# Patient Record
Sex: FEMALE | URBAN - METROPOLITAN AREA
[De-identification: names, ages, dates, MRNs, and addresses within clinical notes are randomized per-mention and may not be internally consistent; named-entity substitution may affect disease eponyms.]

---

## 2017-01-10 ENCOUNTER — OFFICE VISIT (OUTPATIENT)
Dept: PSYCHIATRY | Facility: CLINIC | Age: 57
End: 2017-01-10
Attending: PSYCHIATRY & NEUROLOGY
Payer: COMMERCIAL

## 2017-01-10 VITALS
BODY MASS INDEX: 36.94 KG/M2 | SYSTOLIC BLOOD PRESSURE: 136 MMHG | HEART RATE: 75 BPM | DIASTOLIC BLOOD PRESSURE: 86 MMHG | WEIGHT: 183 LBS

## 2017-01-10 DIAGNOSIS — F31.81 BIPOLAR 2 DISORDER (H): Primary | ICD-10-CM

## 2017-01-10 PROCEDURE — 99212 OFFICE O/P EST SF 10 MIN: CPT | Mod: ZF

## 2017-01-10 RX ORDER — LAMOTRIGINE 100 MG/1
100 TABLET ORAL AT BEDTIME
Qty: 60 TABLET | Refills: 0 | Status: SHIPPED | OUTPATIENT
Start: 2017-01-10 | End: 2017-02-07

## 2017-01-10 RX ORDER — QUETIAPINE FUMARATE 25 MG/1
25 TABLET, FILM COATED ORAL
Qty: 120 TABLET | Refills: 0 | Status: SHIPPED | OUTPATIENT
Start: 2017-01-10 | End: 2017-07-25

## 2017-01-10 NOTE — PATIENT INSTRUCTIONS
Increase Lamotrigine to 300 mg at night  Stop Ambien  Take Seroquel 25-50 mg as needed for sleep  Continue other medications  Return in 4 weeks

## 2017-01-10 NOTE — NURSING NOTE
Chief Complaint   Patient presents with     Eval/Assessment     Reviewed allergies, smoking status, and pharmacy preference  Administered abuse screening questions   Obtained weight, blood pressure and heart rate

## 2017-01-12 ASSESSMENT — PATIENT HEALTH QUESTIONNAIRE - PHQ9: SUM OF ALL RESPONSES TO PHQ QUESTIONS 1-9: 12

## 2017-02-07 ENCOUNTER — OFFICE VISIT (OUTPATIENT)
Dept: PSYCHIATRY | Facility: CLINIC | Age: 57
End: 2017-02-07
Attending: PSYCHIATRY & NEUROLOGY
Payer: COMMERCIAL

## 2017-02-07 VITALS — SYSTOLIC BLOOD PRESSURE: 131 MMHG | HEART RATE: 76 BPM | DIASTOLIC BLOOD PRESSURE: 82 MMHG

## 2017-02-07 DIAGNOSIS — F31.9 BIPOLAR AFFECTIVE DISORDER, REMISSION STATUS UNSPECIFIED (H): ICD-10-CM

## 2017-02-07 DIAGNOSIS — F31.81 BIPOLAR 2 DISORDER (H): Primary | ICD-10-CM

## 2017-02-07 PROCEDURE — 99212 OFFICE O/P EST SF 10 MIN: CPT | Mod: ZF

## 2017-02-07 RX ORDER — LAMOTRIGINE 200 MG/1
400 TABLET ORAL AT BEDTIME
Qty: 60 TABLET | Refills: 0 | Status: SHIPPED | OUTPATIENT
Start: 2017-02-07 | End: 2017-05-12

## 2017-02-07 RX ORDER — ESCITALOPRAM OXALATE 20 MG/1
20 TABLET ORAL DAILY
Qty: 90 TABLET | Refills: 3 | Status: SHIPPED | OUTPATIENT
Start: 2017-02-07 | End: 2017-03-08

## 2017-02-07 NOTE — PATIENT INSTRUCTIONS
Increase lamotrigine to 400 mg at night  Continue other medications  Return in 4 weeks  Thank you for coming to [facility].    Lab Testing:  **If you had lab testing today and your results are reassuring or normal they will be mailed to you or sent through [employer] within 7 days.   **If the lab tests need quick action we will call you with the results.  The phone number we will call with results is # [phone] (home) . If this is not the best number please call our clinic and change the number.    Medication Refills:  If you need any refills please call your pharmacy and they will contact us. Please allow three business for refill processing.   If you need to  your refill at a new pharmacy, please contact the new pharmacy directly. The new pharmacy will help you get your medications transferred.     Scheduling:  If you have any concerns about today's visit or wish to schedule another appointment please call our office during normal business hours [phone] (8-5:00 M-F)    Contact Us:  Please call [phone] during business hours (8-5:00 M-F).  If after clinic hours, or on the weekend, please call  [phone].      MENTAL HEALTH CRISIS NUMBERS:  [facility]:   [facility] - [phone]   Crisis Residence [facility] - [phone]   Walk-In Counseling [place] [phone]   COPE 24/7 [place] Mobile Team for Adults - [[phone]]; Child - [[phone]]     Crisis Connection - [phone]     [facility] - [phone]   Walk-in counseling [facility] - [phone]   Walk-in counseling [facility] - [phone]   Crisis Residence [facility] - [phone]   Urgent Care Adult Mental Health:   --Drop-in, 24/7 crisis line, and Pavel Hernández Mobile Team [[phone]]    CRISIS TEXT LINE: Text 127-511 from anywhere, anytime, any crisis 24/7;    OR SEE  www.crisistextline.org     Poison Control Center - [phone]    CHILD: [facility] needs assessment team - [phone]     [facility] Lifeline - [phone]; or [facility] Lifeline - 1-568.792.9062    If you have a medical emergency please call 911or go to the nearest ER.                    _____________________________________________    Again thank you for choosing PSY[facility]ATRY CLINIC and please let us know how we can best partner with you to improve you and your family's health.  You may be receiving a survey in the mail regarding this appointment. We would love to have your feedback, both positive and negative, so please fill out the survey and return it using the provided envolpe. The survey is done by an external company, so your answers are anonymous.

## 2017-02-07 NOTE — MR AVS SNAPSHOT
After Visit Summary   2/7/2017    Mary Mathews    MRN: [id number]           Patient Information     Date Of Birth          1960        Visit Information        Provider Department      2/7/2017 9:30 AM Boaz Saleem MD Psychiatry Clinic        Today's Diagnoses     Bipolar 2 disorder (H)    -  1     Bipolar affective disorder, remission status unspecified (H)           Care Instructions    Thank you for coming to [facility].    Lab Testing:  **If you had lab testing today and your results are reassuring or normal they will be mailed to you or sent through [employer] within 7 days.   **If the lab tests need quick action we will call you with the results.  The phone number we will call with results is # [phone] (home) . If this is not the best number please call our clinic and change the number.    Medication Refills:  If you need any refills please call your pharmacy and they will contact us. Please allow three business for refill processing.   If you need to  your refill at a new pharmacy, please contact the new pharmacy directly. The new pharmacy will help you get your medications transferred.     Scheduling:  If you have any concerns about today's visit or wish to schedule another appointment please call our office during normal business hours [phone] (8-5:00 M-F)    Contact Us:  Please call [phone] during business hours (8-5:00 M-F).  If after clinic hours, or on the weekend, please call  [phone].      MENTAL HEALTH CRISIS NUMBERS:  [facility]:   [facility] - [phone]   Crisis Residence [facility] Residence - [phone]   Walk-In Counseling [facility] - [phone]   COPE 24/7 [place] Mobile Team for Adults - [[phone]]; Child - [[phone]]     Crisis Connection - [phone]     [facility]:   [facility] - [phone]   Walk-in counseling [facility] - [phone]   Walk-in  counseling [facility] - [facility] - [phone]   Crisis Residence [facility] - [phone]   Urgent Care Adult Mental Health:   --Drop-in, 24/7 crisis line, and Pavel Hernández Mobile Team [[phone]]    CRISIS TEXT LINE: Text 113-491 from anywhere, anytime, any crisis 24/7;    OR SEE www.crisistextline.org     Poison Control Center - [phone]    CHILD: [facility] needs assessment team - [phone]     [facility] Lifeline - [phone]; or [employer] Lifeline - 1-501.918.4026    If you have a medical emergency please call 911or go to the nearest ER.                    _____________________________________________    Again thank you for choosing [facility]ATRY CLINIC and please let us know how we can best partner with you to improve you and your family's health.  You may be receiving a survey in the mail regarding this appointment. We would love to have your feedback, both positive and negative, so please fill out the survey and return it using the provided envolpe. The survey is done by an external company, so your answers are anonymous.           Follow-ups after your visit        Your next 10 appointments already scheduled     Feb 28, 2017 10:30 AM   Adult Med Follow UP with Boaz Saleem MD   Psychiatry Clinic ([facility])    [address] [facility] [address] [phone]              Who to contact     Please call your clinic at [phone] to:    Ask questions about your health    Make or cancel appointments    Discuss your medicines    Learn about your test results    Speak to your doctor   If you have compliments or concerns about an experience at your clinic, or if you wish to file a complaint, please contact [facility] Patient Relations at [phone] or email us at [email or web address]         Additional Information About Your Visit        Care EveryWhere ID     This  is your Care EveryWhere ID. This could be used by other organizations to access your [place] medical records  [id number]        Your Vitals Were     Pulse                   76            Blood Pressure from Last 3 Encounters:   02/07/17 131/82   01/10/17 136/86   11/23/16 118/80    Weight from Last 3 Encounters:   01/10/17 83.008 kg (183 lb)   12/22/16 81.647 kg (180 lb)   09/08/16 81.647 kg (180 lb)              Today, you had the following     No orders found for display         Today's Medication Changes          These changes are accurate as of: 2/7/17 10:12 AM.  If you have any questions, ask your nurse or doctor.               These medicines have changed or have updated prescriptions.        Dose/Directions    * lamoTRIgine 200 MG tablet   Commonly known as:  LaMICtal   This may have changed:  Another medication with the same name was changed. Make sure you understand how and when to take each.   Used for:  Bipolar affective disorder, remission status unspecified (H)   Changed by:  Soo Soto MD        Dose:  200 mg   Take 1 tablet (200 mg) by mouth daily   Quantity:  90 tablet   Refills:  1       * lamoTRIgine 200 MG tablet   Commonly known as:  LaMICtal   This may have changed:    - medication strength  - how much to take  - additional instructions   Used for:  Bipolar 2 disorder (H)   Changed by:  Boaz Saleem MD        Dose:  400 mg   Take 2 tablets (400 mg) by mouth At Bedtime   Quantity:  60 tablet   Refills:  0       * Notice:  This list has 2 medication(s) that are the same as other medications prescribed for you. Read the directions carefully, and ask your doctor or other care provider to review them with you.         Where to get your medicines      [facility] [place] [place] - [address] AT Portal to [facility]  [address], [facility] [address]     Phone:  [phone]    - escitalopram 20 MG tablet       These medications were sent to [id number] - [place], [place] - [address] AT [address]  [address], [place] [address]     Phone:  [phone]    - lamoTRIgine 200 MG tablet             Primary Care Provider Office Phone # Fax #    Soo Soto [phone]       [address] [address]        Thank you!     Thank you for choosing [facility]RY [facility]INIC  for your care. Our goal is always to provide you with excellent care. Hearing back from our patients is one way we can continue to improve our services. Please take a few minutes to complete the written survey that you may receive in the mail after your visit with us. Thank you!             Your Updated Medication List - Protect others around you: Learn how to safely use, store and throw away your medicines at www.disposemymeds.org.          This list is accurate as of: 2/7/17 10:12 AM.  Always use your most recent med list.                   Brand Name Dispense Instructions for use    * AMBIEN PO          * zolpidem 5 MG tablet    AMBIEN    30 tablet    Take 1 tablet (5 mg) by mouth nightly as needed for sleep       * cetirizine 5 MG tablet    zyrTEC         * ZYRTEC ALLERGY 10 MG Caps   Generic drug:  cetirizine HCl          diphenhydrAMINE 25 MG capsule    BENADRYL     Take 25 mg by mouth       escitalopram 20 MG tablet    LEXAPRO    90 tablet    Take 1 tablet (20 mg) by mouth daily       fluocinonide 0.05 % ointment    LIDEX     Apply affected areas on the thighs and under the breasts twice daily for 2-3 weeks       fluticasone 50 MCG/ACT spray    FLONASE     1 spray       klonoPIN 0.5 MG tablet   Generic drug:  clonazePAM      Take 0.5 mg by mouth       * lamoTRIgine 200 MG tablet    LaMICtal    90 tablet    Take 1 tablet (200 mg) by mouth daily       * lamoTRIgine 200 MG tablet    LaMICtal    60 tablet    Take 2 tablets (400 mg) by mouth At Bedtime        naproxen 500 MG tablet    NAPROSYN     Take 500 mg by mouth       nystatin cream    MYCOSTATIN     Apply topically to affected areas under breast and groin twice daily.       QUEtiapine 25 MG tablet    SEROquel    120 tablet    Take 1 tablet (25 mg) by mouth nightly as needed       RETIN-A 0.025 % cream   Generic drug:  tretinoin      Apply a small amount topically to face twice weekly at night and increase to nightly as tolerated.       * Notice:  This list has 6 medication(s) that are the same as other medications prescribed for you. Read the directions carefully, and ask your doctor or other care provider to review them with you.

## 2017-02-07 NOTE — PROGRESS NOTES
Feb 7, 2017  Progress Note    Mary Mathews is a 56 year old year old female with Bipolar II Disorder (296.89) who presents for ongoing psychiatric care. She has been  for 10 years but her  lives in [place] and they have no contact; she considers herself single. She has been in [place] for 2 years and lives on her own in [place]. She hopes to move back to [place] in the reasonably near future. She has no children. She works as a  for [employer].     Diagnosis    Axis 1: Bipolar II disorder, currently euthymic. Maintenance therapy has helped but Mary estimates she spends about 30% of the year depressed or hypomanic.   Axis 2: Likely Cluster B personality traits/disorder  Axis 3: MSK problems including gout, tendonitis in foot, knee pain. Remote cholecystectomy. Remote breast biopsy  Axis 4: Severe stressors - unhappy living in [place]; unhappy with job; socially isolated; mother in [place] with dementia (they have a difficult relationship)  Axis 5: GAF at time of visit: 70    Assessment & Plan     Mary Mathews is currently euthymic. Increasing her lamotrigine to 300 mg daily and continuing escitalopram 20 mg daily has helped further stabilize her mood. However, some affective instability remains. Cluster B personality traits (borderline, likely antisocial) are probably contributing to the picture.  She will increase her lamotrigine further to 400 mg HS. She is interested in seeing a therapist and we will discuss this further at her next visit.     The patient understands the risks, benefits, adverse effects and alternatives. Agrees to treatment with the capacity to do so. Not pregnant and no medical contraindications to treatment. Agrees to call clinic for any problems. The patient understands to call 911 or come to the nearest ED if life threatening or urgent symptoms present.    Attestation:  Patient has been seen and evaluated by me, Boaz Saleem MD, PhD.    I spent a total time  "of 30 minutes face-to-face with the patient during today s office visit.  Over 50% of this time was spent counseling the patient and/or coordinating care regarding diagnostic assessment, medication management.    CRISTINA Hernandez reports that increasing her lamotrigine has been helpful. She has had significant work stress and feels she is much less irritable and labile than otherwise expected. She denies any sugar periods of depression or hypomania since her last visit. However she describes likely dysthymia-level and mildly-hypomanic symptoms. She is interested in increasing LTG further to target these.    Mary also reports she has \"a lot of anger bottled up inside\". She relates this to experiences she had as a child. She recognizes that it contributes to inappropriate emotional displays, particularly of anger, that are independent of her mood. She has had a number of violent outbursts in the past. She also spends time thinking of ways to get revenge on people who irritate her and \"enjoys\" the idea of hurting them. She feels that seeing a therapist would be helpful.     MOOD  Denies current symptoms of depression or hypo/phuong.   ANXIETY  Endorses Symptoms of Generalized Anxiety; PTSD symptoms   PSYCHOSIS  Denies current psychotic symptoms.  MEDICATION ADHERENCE: Good   Current Medications     Current Outpatient Prescriptions   Medication Sig Dispense Refill     lamoTRIgine (LAMICTAL) 100 MG tablet Take 1 tablet (100 mg) by mouth At Bedtime Take along with 200 mg already prescribed for total dose of 300 mg HS 60 tablet 0     QUEtiapine (SEROQUEL) 25 MG tablet Take 1 tablet (25 mg) by mouth nightly as needed 120 tablet 0     clonazePAM (KLONOPIN) 0.5 MG tablet Take 0.5 mg by mouth       diphenhydrAMINE (BENADRYL) 25 MG capsule Take 25 mg by mouth       cetirizine (ZYRTEC) 5 MG tablet        fluocinonide (LIDEX) 0.05 % ointment Apply affected areas on the thighs and under the breasts twice daily for 2-3 weeks       " fluticasone (FLONASE) 50 MCG/ACT nasal spray 1 spray       naproxen (NAPROSYN) 500 MG tablet Take 500 mg by mouth       nystatin (MYCOSTATIN) cream Apply topically to affected areas under breast and groin twice daily.       tretinoin (RETIN-A) 0.025 % cream Apply a small amount topically to face twice weekly at night and increase to nightly as tolerated.       Zolpidem Tartrate (AMBIEN PO)        cetirizine HCl (ZYRTEC ALLERGY) 10 MG CAPS        escitalopram (LEXAPRO) 20 MG tablet Take 1 tablet (20 mg) by mouth daily 90 tablet 3     lamoTRIgine (LAMICTAL) 200 MG tablet Take 1 tablet (200 mg) by mouth daily 90 tablet 1     zolpidem (AMBIEN) 5 MG tablet Take 1 tablet (5 mg) by mouth nightly as needed for sleep 30 tablet 3         Substance use     ETOH: Nil  Cigarettes: Nil  Street Drugs: Nil    Review of Systems     A comprehensive review of systems was performed and is negative other than noted above.     Allergies     Allergies   Allergen Reactions     Acetaminophen Hives     Aspirin Hives     Seasonal Allergies Cough, Difficulty breathing and Itching        Mental Status Exam     /82 mmHg  Pulse 76    Alertness: alert  and oriented  Appearance: well groomed  Behavior/Demeanor: cooperative, pleasant and calm, with good  eye contact  Speech: normal  Language: intact  Psychomotor: fidgety  Mood:  description consistent with euthymia  Affect: full range; was congruent to mood  Thought Process/Associations: unremarkable  Thought Content:  Denies suicidal ideation  Perception:  Denies hallucinations  Insight: good  Judgment: good  Cognition:  does appear grossly intact.    Laboratory

## 2017-02-08 ASSESSMENT — PATIENT HEALTH QUESTIONNAIRE - PHQ9: SUM OF ALL RESPONSES TO PHQ QUESTIONS 1-9: 8

## 2017-03-08 ENCOUNTER — OFFICE VISIT (OUTPATIENT)
Dept: PSYCHIATRY | Facility: CLINIC | Age: 57
End: 2017-03-08
Attending: PSYCHIATRY & NEUROLOGY
Payer: COMMERCIAL

## 2017-03-08 VITALS — SYSTOLIC BLOOD PRESSURE: 109 MMHG | DIASTOLIC BLOOD PRESSURE: 73 MMHG | HEART RATE: 92 BPM

## 2017-03-08 DIAGNOSIS — F31.9 BIPOLAR AFFECTIVE DISORDER, REMISSION STATUS UNSPECIFIED (H): ICD-10-CM

## 2017-03-08 PROCEDURE — 99212 OFFICE O/P EST SF 10 MIN: CPT | Mod: ZF

## 2017-03-08 RX ORDER — ESCITALOPRAM OXALATE 10 MG/1
10 TABLET ORAL DAILY
Qty: 90 TABLET | Refills: 0 | Status: SHIPPED | OUTPATIENT
Start: 2017-03-08 | End: 2017-07-25

## 2017-03-08 RX ORDER — ESCITALOPRAM OXALATE 20 MG/1
20 TABLET ORAL DAILY
Qty: 90 TABLET | Refills: 3 | Status: SHIPPED | OUTPATIENT
Start: 2017-03-08 | End: 2017-07-25

## 2017-03-08 NOTE — MR AVS SNAPSHOT
After Visit Summary   3/8/2017    Mary Mathews    MRN: [id number]           Patient Information     Date Of Birth          1960        Visit Information        Provider Department      3/8/2017 2:00 PM Boaz Saleem MD Psychiatry Clinic        Today's Diagnoses     Bipolar affective disorder, remission status unspecified (H)          Care Instructions    Continue lamotrigine 400 mg daily  Increase Lexapro to 30 mg daily  Contact therapists on the list to make an appointment for talk therapy  Return in 1 month        Follow-ups after your visit        Follow-up notes from your care team     Return in about 4 weeks (around 4/5/2017).      Your next 10 appointments already scheduled     Apr 05, 2017  3:00 PM CDT   Adult Med Follow UP with Boaz Saleem MD   Psychiatry Clinic ([facility])    [place] [id number] [facility] [address] [phone]              Who to contact     Please call your clinic at [phone] to:    Ask questions about your health    Make or cancel appointments    Discuss your medicines    Learn about your test results    Speak to your doctor   If you have compliments or concerns about an experience at your clinic, or if you wish to file a complaint, please contact [facility] Patient Relations at [phone] or email us at [email or web address]         Additional Information About Your Visit        Care EveryWhere ID     This is your Care EveryWhere ID. This could be used by other organizations to access your [place] medical records  [id number]        Your Vitals Were     Pulse                   92            Blood Pressure from Last 3 Encounters:   03/08/17 109/73   02/07/17 131/82   01/10/17 136/86    Weight from Last 3 Encounters:   01/10/17 83 kg (183 lb)   12/22/16 81.6 kg (180 lb)   09/08/16 81.6 kg (180 lb)              Today, you had the following     No orders found  for display         Today's Medication Changes          These changes are accurate as of: 3/8/17  2:37 PM.  If you have any questions, ask your nurse or doctor.               These medicines have changed or have updated prescriptions.        Dose/Directions    * escitalopram 20 MG tablet   Commonly known as:  LEXAPRO   This may have changed:  Another medication with the same name was added. Make sure you understand how and when to take each.   Used for:  Bipolar affective disorder, remission status unspecified (H)   Changed by:  Boaz Saleem MD        Dose:  20 mg   Take 1 tablet (20 mg) by mouth daily   Quantity:  90 tablet   Refills:  3       * escitalopram 10 MG tablet   Commonly known as:  LEXAPRO   This may have changed:  You were already taking a medication with the same name, and this prescription was added. Make sure you understand how and when to take each.   Used for:  Bipolar affective disorder, remission status unspecified (H)   Changed by:  Boaz Saleem MD        Dose:  10 mg   Take 1 tablet (10 mg) by mouth daily Take in addition to 20 mg already prescribed for a total dose of 30 mg daily   Quantity:  90 tablet   Refills:  0       * Notice:  This list has 2 medication(s) that are the same as other medications prescribed for you. Read the directions carefully, and ask your doctor or other care provider to review them with you.         Where to get your medicines      [facility] [place], [place] - [address] AT [address] to [facility]  [address], [facility] [address]     Phone:  [phone]     escitalopram 10 MG tablet    escitalopram 20 MG tablet                Primary Care Provider Office Phone # Fax #    Soo Soto [phone]       [address] [address]        Thank you!     Thank you for choosing PSYCHIATRY CLINIC  for your care. Our goal is  always to provide you with excellent care. Hearing back from our patients is one way we can continue to improve our services. Please take a few minutes to complete the written survey that you may receive in the mail after your visit with us. Thank you!             Your Updated Medication List - Protect others around you: Learn how to safely use, store and throw away your medicines at www.disposemymeds.org.          This list is accurate as of: 3/8/17  2:37 PM.  Always use your most recent med list.                   Brand Name Dispense Instructions for use    * AMBIEN PO          * zolpidem 5 MG tablet    AMBIEN    30 tablet    Take 1 tablet (5 mg) by mouth nightly as needed for sleep       * cetirizine 5 MG tablet    zyrTEC         * ZYRTEC ALLERGY 10 MG Caps   Generic drug:  cetirizine HCl          diphenhydrAMINE 25 MG capsule    BENADRYL     Take 25 mg by mouth       * escitalopram 20 MG tablet    LEXAPRO    90 tablet    Take 1 tablet (20 mg) by mouth daily       * escitalopram 10 MG tablet    LEXAPRO    90 tablet    Take 1 tablet (10 mg) by mouth daily Take in addition to 20 mg already prescribed for a total dose of 30 mg daily       fluocinonide 0.05 % ointment    LIDEX     Apply affected areas on the thighs and under the breasts twice daily for 2-3 weeks       fluticasone 50 MCG/ACT spray    FLONASE     1 spray       klonoPIN 0.5 MG tablet   Generic drug:  clonazePAM      Take 0.5 mg by mouth       * lamoTRIgine 200 MG tablet    LaMICtal    90 tablet    Take 1 tablet (200 mg) by mouth daily       * lamoTRIgine 200 MG tablet    LaMICtal    60 tablet    Take 2 tablets (400 mg) by mouth At Bedtime       naproxen 500 MG tablet    NAPROSYN     Take 500 mg by mouth       nystatin cream    MYCOSTATIN     Apply topically to affected areas under breast and groin twice daily.       QUEtiapine 25 MG tablet    SEROquel    120 tablet    Take 1 tablet (25 mg) by mouth nightly as needed       RETIN-A 0.025 % cream   Generic  drug:  tretinoin      Apply a small amount topically to face twice weekly at night and increase to nightly as tolerated.       * Notice:  This list has 8 medication(s) that are the same as other medications prescribed for you. Read the directions carefully, and ask your doctor or other care provider to review them with you.

## 2017-03-08 NOTE — PATIENT INSTRUCTIONS
Continue lamotrigine 400 mg daily  Increase Lexapro to 30 mg daily  Contact therapists on the list to make an appointment for talk therapy  Return in 1 month

## 2017-03-08 NOTE — PROGRESS NOTES
Mar 8, 2017  Progress Note    Mary Mathews is a 56 year old year old female with Bipolar II Disorder (296.89) who presents for ongoing psychiatric care. She has been  for 10 years but her  lives in [place] and they have no contact; she considers herself single. She has been in [place] for 2 years and lives on her own in [place]. She hopes to move back to [place] in the reasonably near future. She has no children. She works as a  for [employer].     Diagnosis    Axis 1: Bipolar II disorder, currently euthymic. Maintenance therapy has helped but Mary estimates she spends about 30% of the year depressed or hypomanic.   Axis 2: Likely Cluster B personality traits/disorder  Axis 3: MSK problems including gout, tendonitis in foot, knee pain. Remote cholecystectomy. Remote breast biopsy  Axis 4: Severe stressors - unhappy living in [facility]; unhappy with job; socially isolated; mother in [place] with dementia (they have a difficult relationship)  Axis 5: GAF at time of visit: 70    Assessment & Plan     Mary Mathews is currently moderately depressed. Increasing her lamotrigine to 400 mg daily and continuing escitalopram 20 mg daily has not been additionally beneficial. She will increase escitalopram to 30 mg daily and continue lamotrigine 400 mg. I gave her a list of therapists to contact. Cluster B personality traits (borderline, likely antisocial) are probably contributing to the picture.  She will return in 4 weeks.    The patient understands the risks, benefits, adverse effects and alternatives. Agrees to treatment with the capacity to do so. Not pregnant and no medical contraindications to treatment. Agrees to call clinic for any problems. The patient understands to call 911 or come to the nearest ED if life threatening or urgent symptoms present.    Attestation:  Patient has been seen and evaluated by me, Boaz Saleem MD, PhD.    I spent a total time of 30 minutes face-to-face with  "the patient during today s office visit.  Over 50% of this time was spent counseling the patient and/or coordinating care regarding diagnostic assessment, medication management.    HPI     Mary reports that she has been more depressed over the past month. She feels down most of the time and has crying spells. She is more isolative and irritable with others. She has difficulty sleeping, with initial and middle insomnia. She has passive suicidal thoughts but denies any intent or plan.    She is unhappy in her current job but feels \"stuck\" there. She also has a long-term strained relationship with her mother, who has not been supportive lately.    Mary is agreeable with the above plan. She also requests a number for a crisis line in case her suicidal thoughts get stronger, and I will ask Toshia to send that to her.    She will return in 4 weeks.    MOOD  Denies current symptoms of depression or hypo/phuong.   ANXIETY  Endorses Symptoms of Generalized Anxiety; PTSD symptoms   PSYCHOSIS  Denies current psychotic symptoms.  MEDICATION ADHERENCE: Good   Current Medications     Current Outpatient Prescriptions   Medication Sig Dispense Refill     lamoTRIgine (LAMICTAL) 200 MG tablet Take 2 tablets (400 mg) by mouth At Bedtime 60 tablet 0     escitalopram (LEXAPRO) 20 MG tablet Take 1 tablet (20 mg) by mouth daily 90 tablet 3     QUEtiapine (SEROQUEL) 25 MG tablet Take 1 tablet (25 mg) by mouth nightly as needed 120 tablet 0     clonazePAM (KLONOPIN) 0.5 MG tablet Take 0.5 mg by mouth       diphenhydrAMINE (BENADRYL) 25 MG capsule Take 25 mg by mouth       cetirizine (ZYRTEC) 5 MG tablet        fluocinonide (LIDEX) 0.05 % ointment Apply affected areas on the thighs and under the breasts twice daily for 2-3 weeks       fluticasone (FLONASE) 50 MCG/ACT nasal spray 1 spray       naproxen (NAPROSYN) 500 MG tablet Take 500 mg by mouth       nystatin (MYCOSTATIN) cream Apply topically to affected areas under breast and groin " twice daily.       tretinoin (RETIN-A) 0.025 % cream Apply a small amount topically to face twice weekly at night and increase to nightly as tolerated.       Zolpidem Tartrate (AMBIEN PO)        cetirizine HCl (ZYRTEC ALLERGY) 10 MG CAPS        lamoTRIgine (LAMICTAL) 200 MG tablet Take 1 tablet (200 mg) by mouth daily 90 tablet 1     zolpidem (AMBIEN) 5 MG tablet Take 1 tablet (5 mg) by mouth nightly as needed for sleep 30 tablet 3         Substance use     ETOH: Nil  Cigarettes: Nil  Street Drugs: Nil    Review of Systems     A comprehensive review of systems was performed and is negative other than noted above.     Allergies     Allergies   Allergen Reactions     Acetaminophen Hives     Aspirin Hives     Seasonal Allergies Cough, Difficulty breathing and Itching        Mental Status Exam     /73  Pulse 92    Alertness: alert  and oriented  Appearance: well groomed  Behavior/Demeanor: cooperative, pleasant and calm, with good  eye contact  Speech: normal  Language: intact  Psychomotor: fidgety  Mood:  depressed and anxious  Affect: restricted; was congruent to mood  Thought Process/Associations: unremarkable  Thought Content:  Admits suicidal ideation and passive suicidal ideation with no plan or intent  Perception:  Denies hallucinations  Insight: good  Judgment: good  Cognition:  does appear grossly intact.    Laboratory

## 2017-03-09 ENCOUNTER — TELEPHONE (OUTPATIENT)
Dept: PSYCHIATRY | Facility: CLINIC | Age: 57
End: 2017-03-09

## 2017-03-09 ASSESSMENT — PATIENT HEALTH QUESTIONNAIRE - PHQ9: SUM OF ALL RESPONSES TO PHQ QUESTIONS 1-9: 13

## 2017-03-09 NOTE — TELEPHONE ENCOUNTER
Routed to POLLY Clement with request to call pt with the following #s:  Clinic After Hours  [phone]  [facility] [phone]  Suicide Prevention [phone]  Suicide Hotline 3-878-QPVUADP

## 2017-03-09 NOTE — TELEPHONE ENCOUNTER
----- Message from Boaz Saleem MD sent at 3/8/2017  2:36 PM CST -----  Sd Claros     Mary asked for a number for a Crisis Line. She has intermittent crying spells and passive SI associated with them. I didn't know a number to give her. Can you contact her?    Thx!    DB

## 2017-03-10 NOTE — TELEPHONE ENCOUNTER
Called patient again this afternoon, no answer. Left VM with clinic phone number and clinic after hours number but I don't see permission anywhere to leave detailed messages so I was unable to leave the rest of the numbers. Racquel Amaral LPN

## 2017-05-10 ENCOUNTER — TELEPHONE (OUTPATIENT)
Dept: PSYCHIATRY | Facility: CLINIC | Age: 57
End: 2017-05-10

## 2017-05-10 DIAGNOSIS — F31.81 BIPOLAR 2 DISORDER (H): ICD-10-CM

## 2017-05-10 NOTE — TELEPHONE ENCOUNTER
FW: Patient Call  Received: Today       Racquel Amaral LPN Flaa, Jodi L RN       Phone Number: [phone]                     Last refill was 3/17/17 at [facility] for 90 d/s. The script has 0 refills left, which is why it has not been refilled.                Previous Messages       ----- Message -----      From: Charmaine Valle      Sent: 5/10/2017  12:34 PM        To: Racquel Amaral LPN   Subject: Patient Call                                     Mary is calling.  She said that she received a call from our office stating that her pharmacy had called in a refill request for Lamotrigine, but we needed to clarify some info.  She would like a call back at [phone].  It is ok to leave a detailed message.      She did say that she is currently taking Lamotrigine 200 mg - 2 tablets (400 mg) at bedtime.  The pharmacy she uses is the [facility] mail order [facility].  I scheduled a follow up appointment for her with Dr. Saleem on 6/7.

## 2017-05-10 NOTE — TELEPHONE ENCOUNTER
Received RF request from pt for:  Lamictal 200 mg tab  Take 2 tabs po daily    Last RF:  3/17/17, 90 d/s at [facility] mail order    Due for RF:  Will be in the next month    Appointment History:  Last appt: 3/8/17  RTC: 1 month  Cancel: 4/5/17 pt is sick  No-show: none  Next appt: 6/7/17    Pt has about 1 month of current Rx remaining but mail order can take ~2 weeks for processing and shipping.  Routed to Dr. Saleem for authorization to provide RF now.

## 2017-05-12 RX ORDER — LAMOTRIGINE 200 MG/1
400 TABLET ORAL AT BEDTIME
Qty: 180 TABLET | Refills: 0 | Status: SHIPPED | OUTPATIENT
Start: 2017-05-12 | End: 2017-07-25

## 2017-05-12 NOTE — TELEPHONE ENCOUNTER
Boaz Saleem MD  Floskar, Toshia MIKE RN        Caller: Unspecified (2 days ago,  1:05 PM)                     Thanks for sending Toshia. Let's do the refill now, for another 90 day supply.

## 2017-06-09 ENCOUNTER — TELEPHONE (OUTPATIENT)
Dept: PSYCHIATRY | Facility: CLINIC | Age: 57
End: 2017-06-09

## 2017-06-09 NOTE — TELEPHONE ENCOUNTER
"Is now scheduled on 7/25/17.      Called pt. She begins talking about decreased concentration, forgetting things, feeling like a zombie \"like my eyes are awake but my brain isn't\".  As we talk she shares sleep continues to be poor at \"maybe 3 hours a night.\"  Difficulty sleeping has been long-term; since prior to January jackie.  Has decreased energy, isolating more because she doesn't want to go out, experiences this confusion/memory issue occurs about twice a week, feels safe, denies family hx of memory issues.  We discuss that many of her sxs can occur due to lack of sleep and she agrees stating, \"espcially my lack of energy.\"      We reviewed her meds (Lamictal, Lexapro, Seroquel at hs prn). She takes Lamictal and Lexapro as prescribed.  She had forgotten about using Seroquel prn for sleep.  She plans to take 25 mg at hs prn.   Discussed side effects of Seroquel, especially if she feels too confused or too sedated that she doesn't have to take it.     Writer will call her next week to check in and see if it's helpful.     Routed to paula Morrison.    "

## 2017-06-09 NOTE — TELEPHONE ENCOUNTER
----- Message from Rere Velez sent at 6/8/2017  4:05 PM CDT -----  Contact: [phone]  Stiven/Toshia    Patient is caller. She says that she did not remember the appt was yesterday. She says that the medications are making her a bit confused about dates, so she missed the appt. She did reschedule to the next opening

## 2017-06-22 RX ORDER — NAPROXEN 500 MG/1
TABLET ORAL
Qty: 60 TABLET | Refills: 0 | OUTPATIENT
Start: 2017-06-22

## 2017-06-22 NOTE — TELEPHONE ENCOUNTER
naproxen 500 MG       Last Written Prescription Date:  4/20/16  Last Fill Quantity: UNK # refills: UNK  Last Office Visit : 11/23/16   ALICJA DE LA ROSA  Future Office visit:  NONE  No results found for: CR]  BP Readings from Last 3 Encounters:   03/08/17 109/73   02/07/17 131/82   01/10/17 136/86   Routing refill request to provider for review/approval because:  Medication is reported/historical

## 2017-06-27 NOTE — TELEPHONE ENCOUNTER
Left a detailed message that pt needs to establish new care with another provider for further refills. Phone number given.

## 2017-07-24 DIAGNOSIS — F31.81 BIPOLAR 2 DISORDER (H): ICD-10-CM

## 2017-07-24 RX ORDER — LAMOTRIGINE 200 MG/1
400 TABLET ORAL AT BEDTIME
Qty: 60 TABLET | Refills: 0 | Status: CANCELLED | OUTPATIENT
Start: 2017-07-24

## 2017-07-24 NOTE — TELEPHONE ENCOUNTER
Medication requested: Lamictal 200 mg tabs  Last refilled: 5-12-17  Qty: 180      Last seen: 3-8-17  RTC: 4 wks  Cancel: 1  No-show: 1  Next appt: 7-25-17    Refill decision: Refill pended and routed to the provider for review/determination due to cancellation and NOS - patient is scheduled to be seen tomorrow.      Kathleen M Doege RN

## 2017-07-25 ENCOUNTER — OFFICE VISIT (OUTPATIENT)
Dept: PSYCHIATRY | Facility: CLINIC | Age: 57
End: 2017-07-25
Attending: PSYCHIATRY & NEUROLOGY
Payer: COMMERCIAL

## 2017-07-25 VITALS — DIASTOLIC BLOOD PRESSURE: 83 MMHG | SYSTOLIC BLOOD PRESSURE: 138 MMHG | HEART RATE: 76 BPM

## 2017-07-25 DIAGNOSIS — F31.9 BIPOLAR AFFECTIVE DISORDER, REMISSION STATUS UNSPECIFIED (H): ICD-10-CM

## 2017-07-25 DIAGNOSIS — G47.00 INSOMNIA, UNSPECIFIED TYPE: ICD-10-CM

## 2017-07-25 DIAGNOSIS — F31.81 BIPOLAR 2 DISORDER (H): ICD-10-CM

## 2017-07-25 PROCEDURE — 99212 OFFICE O/P EST SF 10 MIN: CPT | Mod: ZF

## 2017-07-25 RX ORDER — ZOLPIDEM TARTRATE 5 MG/1
5 TABLET ORAL
Qty: 30 TABLET | Refills: 3 | Status: SHIPPED | OUTPATIENT
Start: 2017-07-25

## 2017-07-25 RX ORDER — LAMOTRIGINE 200 MG/1
400 TABLET ORAL AT BEDTIME
Qty: 180 TABLET | Refills: 1 | Status: SHIPPED | OUTPATIENT
Start: 2017-07-25 | End: 2018-02-01

## 2017-07-25 RX ORDER — ESCITALOPRAM OXALATE 10 MG/1
10 TABLET ORAL DAILY
Qty: 90 TABLET | Refills: 1 | Status: SHIPPED | OUTPATIENT
Start: 2017-07-25 | End: 2018-02-01

## 2017-07-25 RX ORDER — ESCITALOPRAM OXALATE 20 MG/1
20 TABLET ORAL DAILY
Qty: 90 TABLET | Refills: 1 | Status: SHIPPED | OUTPATIENT
Start: 2017-07-25 | End: 2018-02-13

## 2017-07-25 NOTE — NURSING NOTE
Chief Complaint   Patient presents with     Recheck Medication     Bipolar affective disorder, remission status unspecified     Reviewed allergies, smoking status, and pharmacy preference    Obtained weight, blood pressure and heart rate

## 2017-07-25 NOTE — MR AVS SNAPSHOT
After Visit Summary   7/25/2017    Mary Mathews    MRN: [id number]           Patient Information     Date Of Birth          1960        Visit Information        Provider Department      7/25/2017 9:00 AM Boaz Saleem MD [facility]        Today's Diagnoses     Insomnia, unspecified type        Bipolar 2 disorder (H)        Bipolar affective disorder, remission status unspecified (H)          Care Instructions    Continue usual medications  Look into seeing a therapist  Increase your physical activity  Return in 8 weeks          Follow-ups after your visit        Follow-up notes from your care team     Return in about 8 weeks (around 9/19/2017).      Your next 10 appointments already scheduled     Sep 26, 2017 10:00 AM CDT   Adult Med Follow UP with Boaz Saleem MD   Psychiatry Clinic ([facility])    [place] [id number] [facility] [address] [phone]              Who to contact     Please call your clinic at [phone] to:    Ask questions about your health    Make or cancel appointments    Discuss your medicines    Learn about your test results    Speak to your doctor   If you have compliments or concerns about an experience at your clinic, or if you wish to file a complaint, please contact [facility] Patient Relations at [phone] or email us at [email or web address]         Additional Information About Your Visit        MyChart Information     [employer] is an electronic gateway that provides easy, online access to your medical records. With [employer], you can request a clinic appointment, read your test results, renew a prescription or communicate with your care team.     To sign up for [employer] visit the website at [email or web address]   You will be asked to enter the access code listed below, as well as some personal information. Please follow the directions to create your  username and password.     Your access code is: [email or web address]  Expires: 10/23/2017  9:33 AM     Your access code will  in 90 days. If you need help or a new code, please contact your [facility] or call [phone] for assistance.        Care EveryWhere ID     This is your Care EveryWhere ID. This could be used by other organizations to access your [facility] medical records  [id number]        Your Vitals Were     Pulse                   76            Blood Pressure from Last 3 Encounters:   17 138/83   17 109/73   17 131/82    Weight from Last 3 Encounters:   01/10/17 83 kg (183 lb)   16 81.6 kg (180 lb)   16 81.6 kg (180 lb)              Today, you had the following     No orders found for display         Where to get your medicines      These medications were sent to [facility] [address] AT [address] [place] [id number]  [place], [facility] [address]     Phone:  [phone]     escitalopram 10 MG tablet    escitalopram 20 MG tablet    lamoTRIgine 200 MG tablet         Some of these will need a paper prescription and others can be bought over the counter.  Ask your nurse if you have questions.     Bring a paper prescription for each of these medications     zolpidem 5 MG tablet          Primary Care Provider    None Specified       No primary provider on file.        Equal Access to Services     CARMEN CUELLAR : Hadii jimena Zhang, waaxda lupatadaha, qaybta kaalmada alex, adrien martínez . So [facility] [phone].    ATENCIÓN: Si habla español, tiene a salas disposición servicios gratuitos de asistencia lingüística. Llame al [phone].    We comply with applicable federal civil rights laws and [place] laws. We do not discriminate on the basis of race, color, national origin, age, disability sex, sexual orientation or gender identity.            Thank  you!     Thank you for choosing [facility]  for your care. Our goal is always to provide you with excellent care. Hearing back from our patients is one way we can continue to improve our services. Please take a few minutes to complete the written survey that you may receive in the mail after your visit with us. Thank you!             Your Updated Medication List - Protect others around you: Learn how to safely use, store and throw away your medicines at www.disposemymeds.org.          This list is accurate as of: 7/25/17  9:33 AM.  Always use your most recent med list.                   Brand Name Dispense Instructions for use Diagnosis    * AMBIEN PO           * zolpidem 5 MG tablet    AMBIEN    30 tablet    Take 1 tablet (5 mg) by mouth nightly as needed for sleep    Insomnia, unspecified type       * cetirizine 5 MG tablet    zyrTEC          * ZYRTEC ALLERGY 10 MG Caps   Generic drug:  cetirizine HCl           diphenhydrAMINE 25 MG capsule    BENADRYL     Take 25 mg by mouth        * escitalopram 20 MG tablet    LEXAPRO    90 tablet    Take 1 tablet (20 mg) by mouth daily    Bipolar affective disorder, remission status unspecified (H)       * escitalopram 10 MG tablet    LEXAPRO    90 tablet    Take 1 tablet (10 mg) by mouth daily Take in addition to 20 mg already prescribed for a total dose of 30 mg daily    Bipolar affective disorder, remission status unspecified (H)       fluocinonide 0.05 % ointment    LIDEX     Apply affected areas on the thighs and under the breasts twice daily for 2-3 weeks        fluticasone 50 MCG/ACT spray    FLONASE     1 spray        klonoPIN 0.5 MG tablet   Generic drug:  clonazePAM      Take 0.5 mg by mouth        lamoTRIgine 200 MG tablet    LaMICtal    180 tablet    Take 2 tablets (400 mg) by mouth At Bedtime    Bipolar 2 disorder (H)       naproxen 500 MG tablet    NAPROSYN     Take 500 mg by mouth        nystatin cream    MYCOSTATIN     Apply topically to affected areas  under breast and groin twice daily.        RETIN-A 0.025 % cream   Generic drug:  tretinoin      Apply a small amount topically to face twice weekly at night and increase to nightly as tolerated.        * Notice:  This list has 6 medication(s) that are the same as other medications prescribed for you. Read the directions carefully, and ask your doctor or other care provider to review them with you.

## 2017-07-25 NOTE — PATIENT INSTRUCTIONS
Continue usual medications  Look into seeing a therapist  Increase your physical activity  Return in 8 weeks

## 2017-07-26 ASSESSMENT — PATIENT HEALTH QUESTIONNAIRE - PHQ9: SUM OF ALL RESPONSES TO PHQ QUESTIONS 1-9: 7

## 2017-09-14 ENCOUNTER — OFFICE VISIT (OUTPATIENT)
Dept: ORTHOPEDICS | Facility: CLINIC | Age: 57
End: 2017-09-14

## 2017-09-14 VITALS — HEIGHT: 59 IN | WEIGHT: 170 LBS | BODY MASS INDEX: 34.27 KG/M2

## 2017-09-14 DIAGNOSIS — M76.62 ACHILLES TENDINITIS OF LEFT LOWER EXTREMITY: ICD-10-CM

## 2017-09-14 DIAGNOSIS — M17.0 PRIMARY OSTEOARTHRITIS OF BOTH KNEES: Primary | ICD-10-CM

## 2017-09-14 RX ORDER — NAPROXEN 500 MG/1
500 TABLET ORAL 2 TIMES DAILY WITH MEALS
Qty: 60 TABLET | Refills: 1 | Status: SHIPPED | OUTPATIENT
Start: 2017-09-14 | End: 2018-05-10

## 2017-09-14 RX ORDER — TRIAMCINOLONE ACETONIDE 40 MG/ML
40 INJECTION, SUSPENSION INTRA-ARTICULAR; INTRAMUSCULAR ONCE
Qty: 2 ML | Refills: 0 | OUTPATIENT
Start: 2017-09-14 | End: 2017-09-14

## 2017-09-14 NOTE — MR AVS SNAPSHOT
After Visit Summary   9/14/2017    Mary Mathews    MRN: [id number]           Patient Information     Date Of Birth          1960        Visit Information        Provider Department      9/14/2017 11:30 AM Rodríguez Wilson MD [facility]        Today's Diagnoses     Primary osteoarthritis of both knees    -  1    Achilles tendinitis of left lower extremity           Follow-ups after your visit        Your next 10 appointments already scheduled     Sep 26, 2017 10:00 AM CDT   Adult Med Follow UP with Boaz Saleem MD   Psychiatry Clinic ([facility])    [place] [id number]  [address] [facility] [id number] [phone]            Nov 09, 2017  3:30 PM CST   (Arrive by 3:15 PM)   RE-ESTABLISH NEW with Gretel Calix MD   [facility] ([facility])    [address] [id number] [phone]              Who to contact     Please call your clinic at [phone] to:    Ask questions about your health    Make or cancel appointments    Discuss your medicines    Learn about your test results    Speak to your doctor   If you have compliments or concerns about an experience at your clinic, or if you wish to file a complaint, please contact [facility] Patient Relations at [phone] or email us at [email or web address]         Additional Information About Your Visit        MyChart Information     Noblivity is an electronic gateway that provides easy, online access to your medical records. With Noblivity, you can request a clinic appointment, read your test results, renew a prescription or communicate with your care team.     To sign up for [employer] visit the website at [email or web address]   You will be asked to enter the access code listed below, as well as some personal information. Please follow the directions to  "create your username and password.     Your access code is: [email or web address]  Expires: 10/23/2017  9:33 AM     Your access code will  in 90 days. If you need help or a new code, please contact your [facility] or call [phone] for assistance.        Care EveryWhere ID     This is your Care EveryWhere ID. This could be used by other organizations to access your [place] medical records  [id number]        Your Vitals Were     Height BMI (Body Mass Index)                4' 11\" (1.499 m) 34.34 kg/m2           Blood Pressure from Last 3 Encounters:   16 118/80    Weight from Last 3 Encounters:   17 170 lb (77.1 kg)   16 180 lb (81.6 kg)   16 180 lb (81.6 kg)              We Performed the Following     Large Joint/Bursa injection and/or drainage - Bilateral (Shoulder, Knee) [22020] [50 Mod]     TRIAMCINOLONE ACET INJ NOS          Today's Medication Changes          These changes are accurate as of: 17 11:51 AM.  If you have any questions, ask your nurse or doctor.               Start taking these medicines.        Dose/Directions    triamcinolone acetonide 40 MG/ML injection   Commonly known as:  KENALOG   Used for:  Primary osteoarthritis of both knees        Dose:  40 mg   1 mL (40 mg) by INTRA-ARTICULAR route once for 1 dose   Quantity:  2 mL   Refills:  0         These medicines have changed or have updated prescriptions.        Dose/Directions    naproxen 500 MG tablet   Commonly known as:  NAPROSYN   This may have changed:  when to take this   Used for:  Primary osteoarthritis of both knees        Dose:  500 mg   Take 1 tablet (500 mg) by mouth 2 times daily (with meals)   Quantity:  60 tablet   Refills:  1            Where to get your medicines      Some of these will need a paper prescription and others can be bought over the counter.  Ask your nurse if you have questions.     Bring a paper prescription for each of these medications     naproxen 500 " MG tablet       You don't need a prescription for these medications     triamcinolone acetonide 40 MG/ML injection                Primary Care Provider    None Specified       No primary provider on file.        Equal Access to Services     RADHA CUELLAR : Riccardo Zhang, estherkeith beyrajaniha, laureen johnson, adrien parish laDennynorbert gruber. So [facility] [phone].    ATENCIÓN: Si habla español, tiene a salas disposición servicios gratuitos de asistencia lingüística. Llame al [phone].    We comply with applicable federal civil rights laws and [place] laws. We do not discriminate on the basis of race, color, national origin, age, disability sex, sexual orientation or gender identity.            Thank you!     Thank you for choosing [facility]  for your care. Our goal is always to provide you with excellent care. Hearing back from our patients is one way we can continue to improve our services. Please take a few minutes to complete the written survey that you may receive in the mail after your visit with us. Thank you!             Your Updated Medication List - Protect others around you: Learn how to safely use, store and throw away your medicines at www.disposemymeds.org.          This list is accurate as of: 9/14/17 11:51 AM.  Always use your most recent med list.                   Brand Name Dispense Instructions for use Diagnosis    * AMBIEN PO           * zolpidem 5 MG tablet    AMBIEN    30 tablet    Take 1 tablet (5 mg) by mouth nightly as needed for sleep    Insomnia, unspecified type       * cetirizine 5 MG tablet    zyrTEC          * ZYRTEC ALLERGY 10 MG Caps   Generic drug:  cetirizine HCl           diphenhydrAMINE 25 MG capsule    BENADRYL     Take 25 mg by mouth        * escitalopram 20 MG tablet    LEXAPRO    90 tablet    Take 1 tablet (20 mg) by mouth daily    Bipolar affective disorder, remission status unspecified (H)       * escitalopram 10 MG tablet     LEXAPRO    90 tablet    Take 1 tablet (10 mg) by mouth daily Take in addition to 20 mg already prescribed for a total dose of 30 mg daily    Bipolar affective disorder, remission status unspecified (H)       fluocinonide 0.05 % ointment    LIDEX     Apply affected areas on the thighs and under the breasts twice daily for 2-3 weeks        fluticasone 50 MCG/ACT spray    FLONASE     1 spray        klonoPIN 0.5 MG tablet   Generic drug:  clonazePAM      Take 0.5 mg by mouth        lamoTRIgine 200 MG tablet    LaMICtal    180 tablet    Take 2 tablets (400 mg) by mouth At Bedtime    Bipolar 2 disorder (H)       naproxen 500 MG tablet    NAPROSYN    60 tablet    Take 1 tablet (500 mg) by mouth 2 times daily (with meals)    Primary osteoarthritis of both knees       nystatin cream    MYCOSTATIN     Apply topically to affected areas under breast and groin twice daily.        RETIN-A 0.025 % cream   Generic drug:  tretinoin      Apply a small amount topically to face twice weekly at night and increase to nightly as tolerated.        triamcinolone acetonide 40 MG/ML injection    KENALOG    2 mL    1 mL (40 mg) by INTRA-ARTICULAR route once for 1 dose    Primary osteoarthritis of both knees       * Notice:  This list has 6 medication(s) that are the same as other medications prescribed for you. Read the directions carefully, and ask your doctor or other care provider to review them with you.

## 2017-09-14 NOTE — LETTER
9/14/2017      RE: Mary Mathews  [address]   [place] [address]       September 14, 2017: Mary Mathews is a 57 year old female who is seen in f/u up for Bilateral knee CSI with kenalog injections and pain on the back of her Left foot at the achilles tendon.        PMH:  No past medical history on file.    Active problem list:  There is no problem list on file for this patient.      FH:  No family history on file.    SH:  Social History     Social History     Marital status: Single     Spouse name: N/A     Number of children: N/A     Years of education: N/A     Occupational History     Not on file.     Social History Main Topics     Smoking status: Never Smoker     Smokeless tobacco: Not on file     Alcohol use Not on file     Drug use: Not on file     Sexual activity: Not on file     Other Topics Concern     Not on file     Social History Narrative       MEDS:  See EMR, reviewed  ALL:  See EMR, reviewed    REVIEW OF SYSTEMS:  CONSTITUTIONAL:NEGATIVE for fever, chills, change in weight  INTEGUMENTARY/SKIN: NEGATIVE for worrisome rashes, moles or lesions  EYES: NEGATIVE for vision changes or irritation  ENT/MOUTH: NEGATIVE for ear, mouth and throat problems  RESP:NEGATIVE for significant cough or SOB  BREAST: NEGATIVE for masses, tenderness or discharge  CV: NEGATIVE for chest pain, palpitations or peripheral edema  GI: NEGATIVE for nausea, abdominal pain, heartburn, or change in bowel habits  :NEGATIVE for frequency, dysuria, or hematuria  :NEGATIVE for frequency, dysuria, or hematuria  NEURO: NEGATIVE for weakness, dizziness or paresthesias  ENDOCRINE: NEGATIVE for temperature intolerance, skin/hair changes  HEME/ALLERGY/IMMUNE: NEGATIVE for bleeding problems  PSYCHIATRIC: NEGATIVE for changes in mood or affect      OBJECTIVE:  The bilateral knees reveal medial joint space narrowing and some patellofemoral DJD as well with a moderate degree.  She has full range of motion of the bilateral knees to  flexion and extension.  There is no effusion or signs of cellulitis.  She has some mild tenderness along the medial joint line, nontender over the lateral joint line, nontender at the pes anserine bursa.  Normal range of motion at the hips.  Anterior and posterior drawer is negative.  She does not seem to have a significant varus or valgus deformity.       ANKLE  Inspection/Palpation:     Swelling: no swelling ; mildly t/t left achilles tendon; no defect, normal strength     Non-tender: ATFL, CFL, PTFL, medial malleolus, deltoid ligament, anterior tib-fib ligament   Range of Motion: dorsiflexion: full, plantarflexion: full, inversion: full, eversion: full  Strength:dorsiflexion: 5/5, plantarflexion: 5/5, inversion: 5/5, eversion: 5/5   Special tests: negative anterior drawer, negative varus stress, negative valgus stress, negative forced external rotation, negative Lopez sign             ASSESSMENT:  DJD of the bilateral knees. Achilles tendinitis left heel       PLAN:  All conservative cares were explained including over-the-counter pain medicines and usual side effects, Synvisc injections, cortisone injections, physical therapy options,  brace and indications for knee replacement.  She does not want to consider knee replacement at this time.  I think it is reasonable to continue with the cortisone injections as long as she is getting such good relief.  So, after informed consent about bleeding, infection or steroid flare, after prepping with surgical scrub, she was injected in the right knee from a lateral approach in the seated position with 1 mL of Kenalog 40 and 5 mL of 1% lidocaine and injected the opposite knee with the same combination of medicines.  She tolerated this without difficulty.  She will look for improvement with the injections and follow up as needed. She has dealt with Achilles tendinitis in past and understands stretches from PT, heel lifts, local ice and massage to the tendon, and  1-2 weeks of relative rest in CAM boot she has at home.  She will reinstitute these interventions.                    Rodríguez Wilson MD

## 2017-09-14 NOTE — NURSING NOTE
[address] [address]  Dept: [phone]  ______________________________________________________________________________    Patient: Mary Mathews   : 1960   MRN: [id number]   2017    INVASIVE PROCEDURE SAFETY CHECKLIST    Date: 2017   Procedure:bilateral knee kenalog injection  Patient Name: Mary Mathews  MRN: [id number]  YOB: 1960    Action: Complete sections as appropriate. Any discrepancy results in a HARD COPY until resolved.     PRE PROCEDURE:  Patient ID verified with 2 identifiers (name and  or MRN): Yes  Procedure and site verified with patient/designee (when able): Yes  Accurate consent documentation in medical record: Yes  H&P (or appropriate assessment) documented in medical record: Yes  H&P must be up to 20 days prior to procedure and updates within 24 hours of procedure as applicable: Yes  Relevant diagnostic and radiology test results appropriately labeled and displayed as applicable: NA  Procedure site(s) marked with provider initials: Yes    TIMEOUT:  Time-Out performed immediately prior to starting procedure, including verbal and active participation of all team members addressing the following:NA  * Correct patient identify  * Confirmed that the correct side and site are marked  * An accurate procedure consent form  * Agreement on the procedure to be done  * Correct patient position  * Relevant images and results are properly labeled and appropriately displayed  * The need to administer antibiotics or fluids for irrigation purposes during the procedure as applicable   * Safety precautions based on patient history or medication use    DURING PROCEDURE: Verification of correct person, site, and procedures any time the responsibility for care of the patient is transferred to another member of the care team.     The following medication was given:     MEDICATION:  Kenalog 40 mg  ROUTE:  intra-articular  SITE: bilateral knee  DOSE: 80mg  LOT #: [id number]  : [employer]  EXPIRATION DATE: 2/19  NDC#: 7631-3872-09   Was there drug waste? No    MEDICATION:  Lidocaine without epinephrine  ROUTE: intra-articular  SITE: bilateral knee  DOSE: 12ml  LOT #: [id number]  : [employer]  EXPIRATION DATE: 3/21  NDC#: 96758-497-77   Was there drug waste? Yes  Amount of drug waste (mL): 10ml.  Reason for waste:  As per MD Radha Schwartz, POLLY  September 14, 2017

## 2017-09-14 NOTE — PROGRESS NOTES
September 14, 2017: Mary Mathews is a 57 year old female who is seen in f/u up for Bilateral knee CSI with kenalog injections and pain on the back of her Left foot at the achilles tendon.        PMH:  No past medical history on file.    Active problem list:  There is no problem list on file for this patient.      FH:  No family history on file.    SH:  Social History     Social History     Marital status: Single     Spouse name: N/A     Number of children: N/A     Years of education: N/A     Occupational History     Not on file.     Social History Main Topics     Smoking status: Never Smoker     Smokeless tobacco: Not on file     Alcohol use Not on file     Drug use: Not on file     Sexual activity: Not on file     Other Topics Concern     Not on file     Social History Narrative       MEDS:  See EMR, reviewed  ALL:  See EMR, reviewed    REVIEW OF SYSTEMS:  CONSTITUTIONAL:NEGATIVE for fever, chills, change in weight  INTEGUMENTARY/SKIN: NEGATIVE for worrisome rashes, moles or lesions  EYES: NEGATIVE for vision changes or irritation  ENT/MOUTH: NEGATIVE for ear, mouth and throat problems  RESP:NEGATIVE for significant cough or SOB  BREAST: NEGATIVE for masses, tenderness or discharge  CV: NEGATIVE for chest pain, palpitations or peripheral edema  GI: NEGATIVE for nausea, abdominal pain, heartburn, or change in bowel habits  :NEGATIVE for frequency, dysuria, or hematuria  :NEGATIVE for frequency, dysuria, or hematuria  NEURO: NEGATIVE for weakness, dizziness or paresthesias  ENDOCRINE: NEGATIVE for temperature intolerance, skin/hair changes  HEME/ALLERGY/IMMUNE: NEGATIVE for bleeding problems  PSYCHIATRIC: NEGATIVE for changes in mood or affect      OBJECTIVE:  The bilateral knees reveal medial joint space narrowing and some patellofemoral DJD as well with a moderate degree.  She has full range of motion of the bilateral knees to flexion and extension.  There is no effusion or signs of cellulitis.  She has some  mild tenderness along the medial joint line, nontender over the lateral joint line, nontender at the pes anserine bursa.  Normal range of motion at the hips.  Anterior and posterior drawer is negative.  She does not seem to have a significant varus or valgus deformity.       ANKLE  Inspection/Palpation:     Swelling: no swelling ; mildly t/t left achilles tendon; no defect, normal strength     Non-tender: ATFL, CFL, PTFL, medial malleolus, deltoid ligament, anterior tib-fib ligament   Range of Motion: dorsiflexion: full, plantarflexion: full, inversion: full, eversion: full  Strength:dorsiflexion: 5/5, plantarflexion: 5/5, inversion: 5/5, eversion: 5/5   Special tests: negative anterior drawer, negative varus stress, negative valgus stress, negative forced external rotation, negative Lopez sign             ASSESSMENT:  DJD of the bilateral knees. Achilles tendinitis left heel       PLAN:  All conservative cares were explained including over-the-counter pain medicines and usual side effects, Synvisc injections, cortisone injections, physical therapy options,  brace and indications for knee replacement.  She does not want to consider knee replacement at this time.  I think it is reasonable to continue with the cortisone injections as long as she is getting such good relief.  So, after informed consent about bleeding, infection or steroid flare, after prepping with surgical scrub, she was injected in the right knee from a lateral approach in the seated position with 1 mL of Kenalog 40 and 5 mL of 1% lidocaine and injected the opposite knee with the same combination of medicines.  She tolerated this without difficulty.  She will look for improvement with the injections and follow up as needed. She has dealt with Achilles tendinitis in past and understands stretches from PT, heel lifts, local ice and massage to the tendon, and 1-2 weeks of relative rest in CAM boot she has at home.  She will reinstitute these  interventions.

## 2017-09-14 NOTE — LETTER
9/14/2017      RE: Mary Mathews  [address]   [place] [address]       September 14, 2017: Mary Mathews is a 57 year old female who is seen in f/u up for Bilateral knee CSI with kenalog injections and pain on the back of her Left foot at the achilles tendon.        PMH:  No past medical history on file.    Active problem list:  There is no problem list on file for this patient.      FH:  No family history on file.    SH:  Social History     Social History     Marital status: Single     Spouse name: N/A     Number of children: N/A     Years of education: N/A     Occupational History     Not on file.     Social History Main Topics     Smoking status: Never Smoker     Smokeless tobacco: Not on file     Alcohol use Not on file     Drug use: Not on file     Sexual activity: Not on file     Other Topics Concern     Not on file     Social History Narrative       MEDS:  See EMR, reviewed  ALL:  See EMR, reviewed    REVIEW OF SYSTEMS:  CONSTITUTIONAL:NEGATIVE for fever, chills, change in weight  INTEGUMENTARY/SKIN: NEGATIVE for worrisome rashes, moles or lesions  EYES: NEGATIVE for vision changes or irritation  ENT/MOUTH: NEGATIVE for ear, mouth and throat problems  RESP:NEGATIVE for significant cough or SOB  BREAST: NEGATIVE for masses, tenderness or discharge  CV: NEGATIVE for chest pain, palpitations or peripheral edema  GI: NEGATIVE for nausea, abdominal pain, heartburn, or change in bowel habits  :NEGATIVE for frequency, dysuria, or hematuria  :NEGATIVE for frequency, dysuria, or hematuria  NEURO: NEGATIVE for weakness, dizziness or paresthesias  ENDOCRINE: NEGATIVE for temperature intolerance, skin/hair changes  HEME/ALLERGY/IMMUNE: NEGATIVE for bleeding problems  PSYCHIATRIC: NEGATIVE for changes in mood or affect      OBJECTIVE:  The bilateral knees reveal medial joint space narrowing and some patellofemoral DJD as well with a moderate degree.  She has full range of motion of the bilateral knees to  flexion and extension.  There is no effusion or signs of cellulitis.  She has some mild tenderness along the medial joint line, nontender over the lateral joint line, nontender at the pes anserine bursa.  Normal range of motion at the hips.  Anterior and posterior drawer is negative.  She does not seem to have a significant varus or valgus deformity.       ANKLE  Inspection/Palpation:     Swelling: no swelling ; mildly t/t left achilles tendon; no defect, normal strength     Non-tender: ATFL, CFL, PTFL, medial malleolus, deltoid ligament, anterior tib-fib ligament   Range of Motion: dorsiflexion: full, plantarflexion: full, inversion: full, eversion: full  Strength:dorsiflexion: 5/5, plantarflexion: 5/5, inversion: 5/5, eversion: 5/5   Special tests: negative anterior drawer, negative varus stress, negative valgus stress, negative forced external rotation, negative Lopez sign             ASSESSMENT:  DJD of the bilateral knees. Achilles tendinitis left heel       PLAN:  All conservative cares were explained including over-the-counter pain medicines and usual side effects, Synvisc injections, cortisone injections, physical therapy options,  brace and indications for knee replacement.  She does not want to consider knee replacement at this time.  I think it is reasonable to continue with the cortisone injections as long as she is getting such good relief.  So, after informed consent about bleeding, infection or steroid flare, after prepping with surgical scrub, she was injected in the right knee from a lateral approach in the seated position with 1 mL of Kenalog 40 and 5 mL of 1% lidocaine and injected the opposite knee with the same combination of medicines.  She tolerated this without difficulty.  She will look for improvement with the injections and follow up as needed. She has dealt with Achilles tendinitis in past and understands stretches from PT, heel lifts, local ice and massage to the tendon, and  1-2 weeks of relative rest in CAM boot she has at home.  She will reinstitute these interventions.      Rodríguez Wilson MD

## 2017-10-25 ENCOUNTER — OFFICE VISIT (OUTPATIENT)
Dept: PSYCHIATRY | Facility: CLINIC | Age: 57
End: 2017-10-25
Attending: PSYCHIATRY & NEUROLOGY
Payer: COMMERCIAL

## 2017-10-25 VITALS — SYSTOLIC BLOOD PRESSURE: 120 MMHG | DIASTOLIC BLOOD PRESSURE: 85 MMHG | HEART RATE: 80 BPM

## 2017-10-25 DIAGNOSIS — F31.9 BIPOLAR AFFECTIVE DISORDER, REMISSION STATUS UNSPECIFIED (H): ICD-10-CM

## 2017-10-25 DIAGNOSIS — F31.81 BIPOLAR 2 DISORDER (H): ICD-10-CM

## 2017-10-25 DIAGNOSIS — G47.00 INSOMNIA, UNSPECIFIED TYPE: ICD-10-CM

## 2017-10-25 PROCEDURE — 99212 OFFICE O/P EST SF 10 MIN: CPT | Mod: ZF

## 2017-10-25 RX ORDER — LAMOTRIGINE 100 MG/1
100 TABLET ORAL DAILY
Qty: 90 TABLET | Refills: 0 | Status: SHIPPED | OUTPATIENT
Start: 2017-10-25 | End: 2018-02-13

## 2017-10-25 NOTE — MR AVS SNAPSHOT
After Visit Summary   10/25/2017    Mary Mathews    MRN: [id number]           Patient Information     Date Of Birth          1960        Visit Information        Provider Department      10/25/2017 2:30 PM Boaz Slaeem MD [facility]iatry [facility]        Today's Diagnoses     Insomnia, unspecified type        Bipolar 2 disorder (H)        Bipolar affective disorder, remission status unspecified (H)          Care Instructions    Increase lamotrigine to 500 mg at night  Continue other medications  Please look for a therapist  Return in 2 weeks          Follow-ups after your visit        Follow-up notes from your care team     Return in about 2 weeks (around 11/8/2017).      Your next 10 appointments already scheduled     Nov 08, 2017 10:00 AM CST   Adult Med Follow UP with Boaz Saleem MD   Psychiatry Clinic ([facility])    [place] [id number]  [address] [facility] [address] [phone]            Nov 09, 2017  3:30 PM CST   (Arrive by 3:15 PM)   RE-ESTABLISH NEW with Gretel Calix MD   [facility] ([facility])    [address] [place] [address] [phone]              Who to contact     Please call your clinic at [phone] to:    Ask questions about your health    Make or cancel appointments    Discuss your medicines    Learn about your test results    Speak to your doctor   If you have compliments or concerns about an experience at your clinic, or if you wish to file a complaint, please contact [facility] Patient Relations at [phone] or email us at [email or web address]         Additional Information About Your Visit        MyChart Information     Amplience is an electronic gateway that provides easy, online access to your medical records. With Amplience, you can request a clinic appointment, read your test results, renew a  prescription or communicate with your care team.     To sign up for RxVantagehart visit the website at [email or web address]   You will be asked to enter the access code listed below, as well as some personal information. Please follow the directions to create your username and password.     Your access code is: [email or web address]  Expires: 2018  6:30 AM     Your access code will  in 90 days. If you need help or a new code, please contact your [facility] or call [phone] for assistance.        Care EveryWhere ID     This is your Care EveryWhere ID. This could be used by other organizations to access your [place] medical records  [id number]        Your Vitals Were     Pulse                   80            Blood Pressure from Last 3 Encounters:   10/25/17 120/85   17 138/83   17 109/73    Weight from Last 3 Encounters:   17 77.1 kg (170 lb)   01/10/17 83 kg (183 lb)   16 81.6 kg (180 lb)              Today, you had the following     No orders found for display         Today's Medication Changes          These changes are accurate as of: 10/25/17 11:59 PM.  If you have any questions, ask your nurse or doctor.               These medicines have changed or have updated prescriptions.        Dose/Directions    * lamoTRIgine 200 MG tablet   Commonly known as:  LaMICtal   This may have changed:  Another medication with the same name was added. Make sure you understand how and when to take each.   Used for:  Bipolar 2 disorder (H)   Changed by:  Boaz Saleem MD        Dose:  400 mg   Take 2 tablets (400 mg) by mouth At Bedtime   Quantity:  180 tablet   Refills:  1       * lamoTRIgine 100 MG tablet   Commonly known as:  LaMICtal   This may have changed:  You were already taking a medication with the same name, and this prescription was added. Make sure you understand how and when to take each.   Used for:  Insomnia, unspecified type   Changed by:  Bond,  Boaz Carvajal MD        Dose:  100 mg   Take 1 tablet (100 mg) by mouth daily   Quantity:  90 tablet   Refills:  0       * Notice:  This list has 2 medication(s) that are the same as other medications prescribed for you. Read the directions carefully, and ask your doctor or other care provider to review them with you.         Where to get your medicines      These medications were sent to [id number] - [place], [place] - [address] [place] [address] AT [facility]  [address], [place] [address]     Phone:  [phone]     lamoTRIgine 100 MG tablet                Primary Care Provider Office Phone # Fax #    Gretel [place] Koko Calix [phone]       [address] [place] [address] [address] [place] [address]        Equal Access to Services     RADHA CUELLAR : Hadii aad ku hadasho Soashlie, waaxda luqadaha, qaybta kaalmada adeegyada, adrien gruber. So [facility] [phone].    ATENCIÓN: Si habla español, tiene a salas disposición servicios gratuitos de asistencia lingüística. [facility] [phone].    We comply with applicable federal civil rights laws and [place] laws. We do not discriminate on the basis of race, color, national origin, age, disability, sex, sexual orientation, or gender identity.            Thank you!     Thank you for choosing PSYCHIATRY CLINIC  for your care. Our goal is always to provide you with excellent care. Hearing back from our patients is one way we can continue to improve our services. Please take a few minutes to complete the written survey that you may receive in the mail after your visit with us. Thank you!             Your Updated Medication List - Protect others around you: Learn how to safely use, store and throw away your medicines at www.disposemymeds.org.          This list is accurate as of: 10/25/17 11:59 PM.  Always use your most recent med list.                   Brand Name Dispense Instructions for use Diagnosis    *  AMBIEN PO           * zolpidem 5 MG tablet    AMBIEN    30 tablet    Take 1 tablet (5 mg) by mouth nightly as needed for sleep    Insomnia, unspecified type       * cetirizine 5 MG tablet    zyrTEC          * ZYRTEC ALLERGY 10 MG Caps   Generic drug:  cetirizine HCl           diphenhydrAMINE 25 MG capsule    BENADRYL     Take 25 mg by mouth        * escitalopram 20 MG tablet    LEXAPRO    90 tablet    Take 1 tablet (20 mg) by mouth daily    Bipolar affective disorder, remission status unspecified (H)       * escitalopram 10 MG tablet    LEXAPRO    90 tablet    Take 1 tablet (10 mg) by mouth daily Take in addition to 20 mg already prescribed for a total dose of 30 mg daily    Bipolar affective disorder, remission status unspecified (H)       fluocinonide 0.05 % ointment    LIDEX     Apply affected areas on the thighs and under the breasts twice daily for 2-3 weeks        fluticasone 50 MCG/ACT spray    FLONASE     1 spray        klonoPIN 0.5 MG tablet   Generic drug:  clonazePAM      Take 0.5 mg by mouth        * lamoTRIgine 200 MG tablet    LaMICtal    180 tablet    Take 2 tablets (400 mg) by mouth At Bedtime    Bipolar 2 disorder (H)       * lamoTRIgine 100 MG tablet    LaMICtal    90 tablet    Take 1 tablet (100 mg) by mouth daily    Insomnia, unspecified type       naproxen 500 MG tablet    NAPROSYN    60 tablet    Take 1 tablet (500 mg) by mouth 2 times daily (with meals)    Primary osteoarthritis of both knees       nystatin cream    MYCOSTATIN     Apply topically to affected areas under breast and groin twice daily.        RETIN-A 0.025 % cream   Generic drug:  tretinoin      Apply a small amount topically to face twice weekly at night and increase to nightly as tolerated.        * Notice:  This list has 8 medication(s) that are the same as other medications prescribed for you. Read the directions carefully, and ask your doctor or other care provider to review them with you.

## 2017-10-25 NOTE — NURSING NOTE
Chief Complaint   Patient presents with     Recheck Medication     Bipolar 2 disorder     Reviewed allergies, smoking status, and pharmacy preference  Administered abuse screening questions   Obtained weight, blood pressure and heart rate

## 2017-10-25 NOTE — PROGRESS NOTES
"Oct 25, 2017  Progress Note    Mary Mathews is a 57 year old year old female with Bipolar II Disorder (296.89) who presents for ongoing psychiatric care. She has been  for 10 years but her  lives in [place] and they have no contact; she considers herself single. She has been in [place] for 2 years and lives on her own in [facility]. She hopes to move back to [place] in the future. She has no children. She works as a  for [employer].     Diagnosis    Axis 1: Bipolar II disorder. Maintenance therapy has helped but Mary estimates she spends about 30% of the year depressed or hypomanic.   Axis 2: Likely Cluster B personality traits/disorder  Axis 3: MSK problems including gout, tendonitis in foot, knee pain. Remote cholecystectomy. Remote breast biopsy  Axis 4: Severe stressors - unhappy living in [facility]; unhappy with job; socially isolated; mother in [facility] with dementia (they have a difficult relationship)  Axis 5: GAF at time of visit: 70    Assessment & Plan     Mary Mathews is currently depressed. She is under a lot of stress as her mom has been diagnosed with Alzheimer's disease and she feels responsible to be her caregiver. She feels \"trpaped\" and has had SI. She is able to contract for safety today. She will increase her LTG to 500 mg at night; continue her other medications; and look for a therapist. She has the list the clinic keeps. If things worsen she will call the clinic or if necessary go to the ER. She will return in 2 weeks.  Cluster B personality traits (borderline, likely antisocial) are probably contributing to the picture.      The patient understands the risks, benefits, adverse effects and alternatives. Agrees to treatment with the capacity to do so. Not pregnant and no medical contraindications to treatment. Agrees to call clinic for any problems. The patient understands to call 911 or come to the nearest ED if life threatening or urgent symptoms " "present.    Attestation:  Patient has been seen and evaluated by me, Boaz Saleem MD, PhD.    I spent a total time of 40 minutes face-to-face with the patient during today s office visit.  Over 50% of this time was spent counseling the patient and/or coordinating care regarding diagnostic assessment, medication management.    CRISTINA Hernandez reports feeling increasingly depressed over the past several weeks. She has been going through a stressful period, as her mother has a dementia and was recently officially diagnosed with Alzheimer's disease. She feels responsible to be her mother's caregiver despite the fact that they have had a very difficult relationship over the years. She will feel guilty if she does not take this on. At  The same time she feels \"trapped\" by having to do this. Her mother is currently in [place] and her family is trying to make arrangement s to bring her here.     Mary endorses SI and we spent some time on a safety plan today. She is able to contract for safety. She has appropriate plans in place if her SI worsens and feels that she will be able to follow through on these.     Mary is agreeable with the above plan. She will return in 2 weeks.    MOOD  Denies current symptoms of depression or hypo/phuong.   ANXIETY  Endorses Symptoms of Generalized Anxiety; PTSD symptoms   PSYCHOSIS  Denies current psychotic symptoms.  MEDICATION ADHERENCE: Good   Current Medications     Current Outpatient Prescriptions   Medication Sig Dispense Refill     naproxen (NAPROSYN) 500 MG tablet Take 1 tablet (500 mg) by mouth 2 times daily (with meals) 60 tablet 1     zolpidem (AMBIEN) 5 MG tablet Take 1 tablet (5 mg) by mouth nightly as needed for sleep 30 tablet 3     lamoTRIgine (LAMICTAL) 200 MG tablet Take 2 tablets (400 mg) by mouth At Bedtime 180 tablet 1     escitalopram (LEXAPRO) 20 MG tablet Take 1 tablet (20 mg) by mouth daily 90 tablet 1     escitalopram (LEXAPRO) 10 MG tablet Take 1 tablet (10 " mg) by mouth daily Take in addition to 20 mg already prescribed for a total dose of 30 mg daily 90 tablet 1     clonazePAM (KLONOPIN) 0.5 MG tablet Take 0.5 mg by mouth       diphenhydrAMINE (BENADRYL) 25 MG capsule Take 25 mg by mouth       cetirizine (ZYRTEC) 5 MG tablet        fluocinonide (LIDEX) 0.05 % ointment Apply affected areas on the thighs and under the breasts twice daily for 2-3 weeks       fluticasone (FLONASE) 50 MCG/ACT nasal spray 1 spray       nystatin (MYCOSTATIN) cream Apply topically to affected areas under breast and groin twice daily.       tretinoin (RETIN-A) 0.025 % cream Apply a small amount topically to face twice weekly at night and increase to nightly as tolerated.       Zolpidem Tartrate (AMBIEN PO)        cetirizine HCl (ZYRTEC ALLERGY) 10 MG CAPS            Substance use     ETOH: Nil  Cigarettes: Nil  Street Drugs: Nil    Review of Systems     A comprehensive review of systems was performed and is negative other than noted above.     Allergies     Allergies   Allergen Reactions     Acetaminophen Hives     Aspirin Hives     Seasonal Allergies Cough, Difficulty breathing and Itching        Mental Status Exam     There were no vitals taken for this visit.    Alertness: alert  and oriented  Appearance: well groomed  Behavior/Demeanor: cooperative, pleasant and calm, with good  eye contact  Speech: normal  Language: intact  Psychomotor: fidgety  Mood:  depressed and anxious  Affect: restricted; was congruent to mood  Thought Process/Associations: unremarkable  Thought Content:  Admits suicidal ideation and passive suicidal ideation with no plan or intent  Perception:  Denies hallucinations  Insight: good  Judgment: good  Cognition:  does appear grossly intact.    Laboratory

## 2017-10-26 ASSESSMENT — PATIENT HEALTH QUESTIONNAIRE - PHQ9: SUM OF ALL RESPONSES TO PHQ QUESTIONS 1-9: 12

## 2018-01-07 ENCOUNTER — HEALTH MAINTENANCE LETTER (OUTPATIENT)
Age: 58
End: 2018-01-07

## 2018-02-01 DIAGNOSIS — F31.81 BIPOLAR 2 DISORDER (H): ICD-10-CM

## 2018-02-01 DIAGNOSIS — F31.9 BIPOLAR AFFECTIVE DISORDER, REMISSION STATUS UNSPECIFIED (H): ICD-10-CM

## 2018-02-01 NOTE — TELEPHONE ENCOUNTER
Medication requested: Lexapro 10 mg tabs and Lamictal 200 mg tabs  Last refilled: unknown mail order  Qty: 90 day supply      Last seen: 10-25-17  RTC: 2 wks  Cancel: 0  No-show: 1  Next appt: none    Refill decision: Refill pended and routed to the provider for review/determination due to NOS x1 and no scheduled appointment, and Pt outside of RTC timeframe.    Scheduling has been notified to contact the pt for appointment.      Kathleen M Doege RN

## 2018-02-02 RX ORDER — LAMOTRIGINE 200 MG/1
400 TABLET ORAL AT BEDTIME
Qty: 60 TABLET | Refills: 0 | Status: SHIPPED | OUTPATIENT
Start: 2018-02-02 | End: 2018-02-13

## 2018-02-02 RX ORDER — ESCITALOPRAM OXALATE 10 MG/1
10 TABLET ORAL DAILY
Qty: 30 TABLET | Refills: 0 | Status: SHIPPED | OUTPATIENT
Start: 2018-02-02 | End: 2018-02-13

## 2018-02-13 ENCOUNTER — OFFICE VISIT (OUTPATIENT)
Dept: PSYCHIATRY | Facility: CLINIC | Age: 58
End: 2018-02-13
Attending: PSYCHIATRY & NEUROLOGY
Payer: COMMERCIAL

## 2018-02-13 VITALS — DIASTOLIC BLOOD PRESSURE: 82 MMHG | HEART RATE: 69 BPM | SYSTOLIC BLOOD PRESSURE: 138 MMHG

## 2018-02-13 DIAGNOSIS — F31.81 BIPOLAR 2 DISORDER (H): ICD-10-CM

## 2018-02-13 DIAGNOSIS — F31.9 BIPOLAR AFFECTIVE DISORDER, REMISSION STATUS UNSPECIFIED (H): ICD-10-CM

## 2018-02-13 PROCEDURE — G0463 HOSPITAL OUTPT CLINIC VISIT: HCPCS | Mod: ZF

## 2018-02-13 RX ORDER — LAMOTRIGINE 200 MG/1
400 TABLET ORAL AT BEDTIME
Qty: 180 TABLET | Refills: 0 | Status: SHIPPED | OUTPATIENT
Start: 2018-02-13 | End: 2018-04-04

## 2018-02-13 RX ORDER — ESCITALOPRAM OXALATE 10 MG/1
10 TABLET ORAL DAILY
Qty: 90 TABLET | Refills: 1 | Status: SHIPPED | OUTPATIENT
Start: 2018-02-13 | End: 2018-04-04

## 2018-02-13 RX ORDER — ESCITALOPRAM OXALATE 20 MG/1
20 TABLET ORAL DAILY
Qty: 90 TABLET | Refills: 1 | Status: SHIPPED | OUTPATIENT
Start: 2018-02-13 | End: 2018-04-04

## 2018-02-13 ASSESSMENT — PAIN SCALES - GENERAL: PAINLEVEL: NO PAIN (0)

## 2018-02-13 NOTE — MR AVS SNAPSHOT
After Visit Summary   2018    Mary Mathews    MRN: [id number]           Patient Information     Date Of Birth          1960        Visit Information        Provider Department      2018 1:30 PM Boaz Saleem MD Psychiatry Clinic        Today's Diagnoses     Bipolar 2 disorder (H)        Bipolar affective disorder, remission status unspecified (H)          Care Instructions    Continue usual medications  Return in 1 month          Follow-ups after your visit        Follow-up notes from your care team     Return in about 4 weeks (around 3/13/2018).      Your next 10 appointments already scheduled     Mar 13, 2018 11:30 AM CDT   Adult Med Follow UP with Boaz Saleem MD   Psychiatry Clinic ([facility])    [address] [facility] [address] [phone]              Who to contact     Please call your clinic at [phone] to:    Ask questions about your health    Make or cancel appointments    Discuss your medicines    Learn about your test results    Speak to your doctor            Additional Information About Your Visit        MyChart Information     better. is an electronic gateway that provides easy, online access to your medical records. With better., you can request a clinic appointment, read your test results, renew a prescription or communicate with your care team.     To sign up for HTPt visit the website at [email or web address]   You will be asked to enter the access code listed below, as well as some personal information. Please follow the directions to create your username and password.     Your access code is: [email or web address]  Expires: 2018  2:15 PM     Your access code will  in 90 days. If you need help or a new code, please contact your [facility] or call [phone] for assistance.        Care EveryWhere ID     This is your Care EveryWhere ID. This could  be used by other organizations to access your [place] medical records  [id number]        Your Vitals Were     Pulse                   69            Blood Pressure from Last 3 Encounters:   02/13/18 138/82   10/25/17 120/85   07/25/17 138/83    Weight from Last 3 Encounters:   09/14/17 77.1 kg (170 lb)   01/10/17 83 kg (183 lb)   12/22/16 81.6 kg (180 lb)              Today, you had the following     No orders found for display         Where to get your medicines      These medications were sent to [facility] - [place], [place] - [address] [facility] AT [address] [facility] [address]  [address] [facility], [place] [address]     Phone:  [phone]     escitalopram 10 MG tablet    escitalopram 20 MG tablet    lamoTRIgine 200 MG tablet          Primary Care Provider Office Phone # Fax #    Gretel Albania Calix [phone]       [address] [facility] [address]  [facility] [address]        Equal Access to Services     CARMEN CUELLAR AH: Hadii aad ku hadasho Soomaali, waaxda luqadaha, qaybta kaalmada adeegyada, waxay idiin hayaan carey martínez . So [facility] [phone].    ATENCIÓN: Si habla español, tiene a salas disposición servicios gratuitos de asistencia lingüística. Llame al [phone].    We comply with applicable federal civil rights laws and [place] laws. We do not discriminate on the basis of race, color, national origin, age, disability, sex, sexual orientation, or gender identity.            Thank you!     Thank you for choosing PSYCHIATRY CLINIC  for your care. Our goal is always to provide you with excellent care. Hearing back from our patients is one way we can continue to improve our services. Please take a few minutes to complete the written survey that you may receive in the mail after your visit with us. Thank you!             Your Updated Medication List - Protect others around you: Learn how to safely use, store and throw away your medicines at www.disposemymeds.org.           This list is accurate as of 2/13/18  2:15 PM.  Always use your most recent med list.                   Brand Name Dispense Instructions for use Diagnosis    * AMBIEN PO           * zolpidem 5 MG tablet    AMBIEN    30 tablet    Take 1 tablet (5 mg) by mouth nightly as needed for sleep    Insomnia, unspecified type       * cetirizine 5 MG tablet    zyrTEC          * ZYRTEC ALLERGY 10 MG Caps   Generic drug:  cetirizine HCl           diphenhydrAMINE 25 MG capsule    BENADRYL     Take 25 mg by mouth        * escitalopram 20 MG tablet    LEXAPRO    90 tablet    Take 1 tablet (20 mg) by mouth daily    Bipolar affective disorder, remission status unspecified (H)       * escitalopram 10 MG tablet    LEXAPRO    90 tablet    Take 1 tablet (10 mg) by mouth daily Take in addition to 20 mg already prescribed for a total dose of 30 mg daily    Bipolar affective disorder, remission status unspecified (H)       fluocinonide 0.05 % ointment    LIDEX     Apply affected areas on the thighs and under the breasts twice daily for 2-3 weeks        fluticasone 50 MCG/ACT spray    FLONASE     1 spray        klonoPIN 0.5 MG tablet   Generic drug:  clonazePAM      Take 0.5 mg by mouth        lamoTRIgine 200 MG tablet    LaMICtal    180 tablet    Take 2 tablets (400 mg) by mouth At Bedtime    Bipolar 2 disorder (H)       naproxen 500 MG tablet    NAPROSYN    60 tablet    Take 1 tablet (500 mg) by mouth 2 times daily (with meals)    Primary osteoarthritis of both knees       nystatin cream    MYCOSTATIN     Apply topically to affected areas under breast and groin twice daily.        RETIN-A 0.025 % cream   Generic drug:  tretinoin      Apply a small amount topically to face twice weekly at night and increase to nightly as tolerated.        * Notice:  This list has 6 medication(s) that are the same as other medications prescribed for you. Read the directions carefully, and ask your doctor or other care provider to review them with you.

## 2018-02-13 NOTE — PROGRESS NOTES
Feb 13, 2018  Progress Note    Mary Mathews is a 57 year old year old female with Bipolar II Disorder (296.89) who presents for ongoing psychiatric care. She has been  for 10 years but her  lives in [place] and they have no contact; she considers herself single. She has been in [place] for 2 years and lives on her own in [facility]. She hopes to move back to [place] in the future. She has no children. She works as a  for [employer].     Diagnosis    Axis 1: Bipolar II disorder. Maintenance therapy has helped but Mary estimates she spends about 30% of the year depressed or hypomanic.   Axis 2: Likely Cluster B personality traits/disorder  Axis 3: MSK problems including gout, tendonitis in foot, knee pain. Remote cholecystectomy. Remote breast biopsy  Axis 4: Severe stressors - unhappy living in [place] [place] [facility]"; unhappy with job; socially isolated; mother in [facility] with dementia (they have a difficult relationship)  Axis 5: GAF at time of visit: 70    Assessment & Plan     Since the last visit, Mary has remained under a lot of stress.  She has had a strained relationship with her mother over the years, and her mother is currently suffering from dementia.  Since he went to visit her over the holidays, but the visit did not go well, and they have had limited contact since.  She also feels a sense of responsibility for her brother, who is going through a divorce, and she is trying to decide whether to move to [place] to help him out.  Since he does feel that her medications are very helpful in stabilizing her mood and in preventing her from doing things impulsively.  She wishes to continue them at their usual doses.  She has been trying to find a therapist, and had 2 visits with different people that did not go well, but I have encouraged her to keep looking.  She will return in 1 month.    The patient understands the risks, benefits, adverse effects and alternatives. Agrees to treatment with  "the capacity to do so. Not pregnant and no medical contraindications to treatment. Agrees to call clinic for any problems. The patient understands to call 911 or come to the nearest ED if life threatening or urgent symptoms present.    Attestation:  Patient has been seen and evaluated by me, Boaz Saleem MD, PhD.    I spent a total time of 40 minutes face-to-face with the patient during today s office visit.  Over 50% of this time was spent counseling the patient and/or coordinating care regarding diagnostic assessment, medication management.    HPI     Since last visit, Mary reports that her mood has been stable.  This is remarkable given the amount of stress that she is under.  She notes that she has always been \"the pillar of strength\" for other people in her family, often to her own detriment.  Although she knows that she cannot be her mom's caretaker, she feels significant guilt about making this decision.  She is also agonizing over whether she should move to [place] to help her brother out, which would also be to her detriment.    Mary is consistent with her medications, and denies substance use.  She herself is surprised at how stable her mood is.  She related the poor experiences that she had with a couple of therapists that she visited, but she is aware that she has a lot of decisions to make, and I would help to be able to talk them through, and I have encouraged her to keep looking for therapist.    Mary will continue her usual medications.  She will return in 1 month.    Mary is agreeable with the above plan. She will return in 2 weeks.    MOOD  Denies current symptoms of depression or hypo/phuong.   ANXIETY  Endorses Symptoms of Generalized Anxiety; PTSD symptoms   PSYCHOSIS  Denies current psychotic symptoms.  MEDICATION ADHERENCE: Good   Current Medications     Current Outpatient Prescriptions   Medication Sig Dispense Refill     lamoTRIgine (LAMICTAL) 200 MG tablet Take 2 " tablets (400 mg) by mouth At Bedtime 60 tablet 0     escitalopram (LEXAPRO) 10 MG tablet Take 1 tablet (10 mg) by mouth daily Take in addition to 20 mg already prescribed for a total dose of 30 mg daily 30 tablet 0     lamoTRIgine (LAMICTAL) 100 MG tablet Take 1 tablet (100 mg) by mouth daily 90 tablet 0     naproxen (NAPROSYN) 500 MG tablet Take 1 tablet (500 mg) by mouth 2 times daily (with meals) 60 tablet 1     zolpidem (AMBIEN) 5 MG tablet Take 1 tablet (5 mg) by mouth nightly as needed for sleep 30 tablet 3     escitalopram (LEXAPRO) 20 MG tablet Take 1 tablet (20 mg) by mouth daily 90 tablet 1     clonazePAM (KLONOPIN) 0.5 MG tablet Take 0.5 mg by mouth       diphenhydrAMINE (BENADRYL) 25 MG capsule Take 25 mg by mouth       cetirizine (ZYRTEC) 5 MG tablet        fluocinonide (LIDEX) 0.05 % ointment Apply affected areas on the thighs and under the breasts twice daily for 2-3 weeks       fluticasone (FLONASE) 50 MCG/ACT nasal spray 1 spray       nystatin (MYCOSTATIN) cream Apply topically to affected areas under breast and groin twice daily.       tretinoin (RETIN-A) 0.025 % cream Apply a small amount topically to face twice weekly at night and increase to nightly as tolerated.       Zolpidem Tartrate (AMBIEN PO)        cetirizine HCl (ZYRTEC ALLERGY) 10 MG CAPS            Substance use     ETOH: Nil  Cigarettes: Nil  Street Drugs: Nil    Review of Systems     A comprehensive review of systems was performed and is negative other than noted above.     Allergies     Allergies   Allergen Reactions     Acetaminophen Hives     Aspirin Hives     Seasonal Allergies Cough, Difficulty breathing and Itching        Mental Status Exam     There were no vitals taken for this visit.    Alertness: alert  and oriented  Appearance: well groomed  Behavior/Demeanor: cooperative, pleasant and calm, with good  eye contact  Speech: normal  Language: intact  Psychomotor: fidgety  Mood:  description consistent with euthymia  Affect:  full range; was congruent to mood  Thought Process/Associations: unremarkable  Thought Content:  Admits suicidal ideation and passive suicidal ideation with no plan or intent  Perception:  Denies hallucinations  Insight: good  Judgment: good  Cognition:  does appear grossly intact.    Laboratory

## 2018-02-13 NOTE — NURSING NOTE
Chief Complaint   Patient presents with     Recheck Medication     Bipolar 2 disorder      Obtained blood pressure and heart rate, pain level, and smoking status   Reviewed allergies, medications, and pharmacy preference  Administered abuse screening questions

## 2018-02-21 ENCOUNTER — OFFICE VISIT (OUTPATIENT)
Dept: ORTHOPEDICS | Facility: CLINIC | Age: 58
End: 2018-02-21
Payer: COMMERCIAL

## 2018-02-21 VITALS — DIASTOLIC BLOOD PRESSURE: 81 MMHG | SYSTOLIC BLOOD PRESSURE: 143 MMHG | RESPIRATION RATE: 16 BRPM | HEART RATE: 66 BPM

## 2018-02-21 DIAGNOSIS — M17.0 PRIMARY OSTEOARTHRITIS OF BOTH KNEES: Primary | ICD-10-CM

## 2018-02-21 RX ORDER — TRIAMCINOLONE ACETONIDE 40 MG/ML
80 INJECTION, SUSPENSION INTRA-ARTICULAR; INTRAMUSCULAR ONCE
Qty: 2 ML | Refills: 0 | OUTPATIENT
Start: 2018-02-21 | End: 2018-02-21

## 2018-02-21 NOTE — PROGRESS NOTES
February 21, 2018: Mary Mathews is a 57 year old female who is seen in f/u up for primary osteoarthritis of both knees. She has been going to the gym 3X/week doing the elliptical. She received her last knee injections in 9/14/18 which lasted around 5 months.           PMH:  No past medical history on file.    Active problem list:  There is no problem list on file for this patient.      FH:  No family history on file.    SH:  Social History     Social History     Marital status: Single     Spouse name: N/A     Number of children: N/A     Years of education: N/A     Occupational History     Not on file.     Social History Main Topics     Smoking status: Never Smoker     Smokeless tobacco: Never Used     Alcohol use Not on file     Drug use: Not on file     Sexual activity: Not on file     Other Topics Concern     Not on file     Social History Narrative       MEDS:  See EMR, reviewed  ALL:  See EMR, reviewed    REVIEW OF SYSTEMS:  CONSTITUTIONAL:NEGATIVE for fever, chills, change in weight  INTEGUMENTARY/SKIN: NEGATIVE for worrisome rashes, moles or lesions  EYES: NEGATIVE for vision changes or irritation  ENT/MOUTH: NEGATIVE for ear, mouth and throat problems  RESP:NEGATIVE for significant cough or SOB  BREAST: NEGATIVE for masses, tenderness or discharge  CV: NEGATIVE for chest pain, palpitations or peripheral edema  GI: NEGATIVE for nausea, abdominal pain, heartburn, or change in bowel habits  :NEGATIVE for frequency, dysuria, or hematuria  :NEGATIVE for frequency, dysuria, or hematuria  NEURO: NEGATIVE for weakness, dizziness or paresthesias  ENDOCRINE: NEGATIVE for temperature intolerance, skin/hair changes  HEME/ALLERGY/IMMUNE: NEGATIVE for bleeding problems  PSYCHIATRIC: NEGATIVE for changes in mood or affect            OBJECTIVE:  She has full range of motion of the bilateral knees to flexion and extension.  There is no effusion or signs of cellulitis.  She has some mild tenderness along the medial joint  line, nontender over the lateral joint line, nontender at the pes anserine bursa.  Normal range of motion at the hips.  Anterior and posterior drawer is negative bilaterally.  She does not seem to have a significant varus or valgus deformity.  Skin intact.  A/ox x3.       xrays knees 3V 12/2016  Moderate bilateral djd           ASSESSMENT:  DJD of the bilateral knees.       PLAN:  All conservative cares were explained including over-the-counter pain medicines and usual side effects, Synvisc injections, cortisone injections, physical therapy options,  brace and indications for knee replacement.  She does not want to consider knee replacement at this time.  I think it is reasonable to continue with the cortisone injections as long as she is getting such good relief.  So, after informed consent about bleeding, infection or steroid flare, after prepping with surgical scrub, she was injected in the right knee from a lateral approach in the seated position with 1 mL of Kenalog 40 and 5 mL of 1% lidocaine and injected the opposite knee from a medial approach with the same combination of medicines.  She tolerated this without difficulty.  She will look for improvement with the injections and follow up as needed.

## 2018-02-21 NOTE — NURSING NOTE
[address] [address]  Dept: [phone]  ______________________________________________________________________________    Patient: Mary Mathews   : 1960   MRN: [id number]   2018    INVASIVE PROCEDURE SAFETY CHECKLIST    Date: 2017   Procedure:Bilateral kenalog knee injections  Patient Name: Mary Mathews  MRN: [id number]  YOB: 1960    Action: Complete sections as appropriate. Any discrepancy results in a HARD COPY until resolved.     PRE PROCEDURE:  Patient ID verified with 2 identifiers (name and  or MRN): Yes  Procedure and site verified with patient/designee (when able): Yes  Accurate consent documentation in medical record: Yes  H&P (or appropriate assessment) documented in medical record: Yes  H&P must be up to 20 days prior to procedure and updates within 24 hours of procedure as applicable: NA  Relevant diagnostic and radiology test results appropriately labeled and displayed as applicable: Yes  Procedure site(s) marked with provider initials: NA    TIMEOUT:  Time-Out performed immediately prior to starting procedure, including verbal and active participation of all team members addressing the following:Yes  * Correct patient identify  * Confirmed that the correct side and site are marked  * An accurate procedure consent form  * Agreement on the procedure to be done  * Correct patient position  * Relevant images and results are properly labeled and appropriately displayed  * The need to administer antibiotics or fluids for irrigation purposes during the procedure as applicable   * Safety precautions based on patient history or medication use    DURING PROCEDURE: Verification of correct person, site, and procedures any time the responsibility for care of the patient is transferred to another member of the care team.     The following medication was given:     MEDICATION:  Kenalog 40 mg; 6ml of  1%  lidocaine (in each syringe)  ROUTE: Intra-Articular  SITE: Bilateral knees  DOSE: Kenalog 40 mg; 6ml of  1% lidocaine (in each syringe)  LOT #: Kenalog: [id number]; Lidocaine: [id number]  :Kenalog: [employer]; Lidocaine: [employer]  EXPIRATION DATE: Kenalo/19; Lidocaine:   NDC#: Kenalo[address]-75[address]-54; Lidocaine: 34645-635-16   Was there drug waste? Yes  Amount of drug waste (mL): 8.  Reason for waste:  As per MD Larry Weaver, ATC  2018

## 2018-02-21 NOTE — MR AVS SNAPSHOT
After Visit Summary   2018    Mary Mathews    MRN: [id number]           Patient Information     Date Of Birth          1960        Visit Information        Provider Department      2018 3:15 PM Rodríguez Wilson MD [facility]        Today's Diagnoses     Primary osteoarthritis of both knees    -  1       Follow-ups after your visit        Your next 10 appointments already scheduled     Mar 13, 2018 11:30 AM CDT   Adult Med Follow UP with Boaz Saleem MD   Psychiatry Clinic ([facility])    [address]  [address] [facility] [id number] [phone]              Who to contact     Please call your clinic at [phone] to:    Ask questions about your health    Make or cancel appointments    Discuss your medicines    Learn about your test results    Speak to your doctor            Additional Information About Your Visit        MyChart Information     [employer] is an electronic gateway that provides easy, online access to your medical records. With [employer], you can request a clinic appointment, read your test results, renew a prescription or communicate with your care team.     To sign up for [employer] visit the website at [email or web address]   You will be asked to enter the access code listed below, as well as some personal information. Please follow the directions to create your username and password.     Your access code is: [email or web address]  Expires: 2018  2:15 PM     Your access code will  in 90 days. If you need help or a new code, please contact your [facility] or call [phone] for assistance.        Care EveryWhere ID     This is your Care EveryWhere ID. This could be used by other organizations to access your [place] medical records  [id number]        Your Vitals Were     Pulse Respirations                66 16           Blood Pressure from Last 3 Encounters:    02/21/18 143/81   11/23/16 118/80    Weight from Last 3 Encounters:   09/14/17 170 lb (77.1 kg)   12/22/16 180 lb (81.6 kg)   09/08/16 180 lb (81.6 kg)              We Performed the Following     Large Joint/Bursa injection and/or drainage - Bilateral (Shoulder, Knee) [49760] [50 Mod]     TRIAMCINOLONE ACET INJ NOS          Today's Medication Changes          These changes are accurate as of 2/21/18  3:54 PM.  If you have any questions, ask your nurse or doctor.               Start taking these medicines.        Dose/Directions    triamcinolone acetonide 40 MG/ML injection   Commonly known as:  KENALOG   Used for:  Primary osteoarthritis of both knees        Dose:  80 mg   2 mLs (80 mg) by INTRA-ARTICULAR route once for 1 dose   Quantity:  2 mL   Refills:  0            Where to get your medicines      Some of these will need a paper prescription and others can be bought over the counter.  Ask your nurse if you have questions.     You don't need a prescription for these medications     triamcinolone acetonide 40 MG/ML injection                Primary Care Provider Office Phone # Fax #    Gretel [facility] [phone]       [address] [facility] [address]  [facility] [address]        Equal Access to Services     RADHA CUELLAR AH: Riccardo meanso Soashlie, waaxda luqadaha, qaybta kaalmada adeegyada, adrien gruber. So [facility] [phone].    ATENCIÓN: Si habla español, tiene a salas disposición servicios gratuitos de asistencia lingüística. Llame al [phone].    We comply with applicable federal civil rights laws and [place] laws. We do not discriminate on the basis of race, color, national origin, age, disability, sex, sexual orientation, or gender identity.            Thank you!     Thank you for choosing [facility]  for your care. Our goal is always to provide you with excellent care. Hearing back from our patients is one way we can continue to  improve our services. Please take a few minutes to complete the written survey that you may receive in the mail after your visit with us. Thank you!             Your Updated Medication List - Protect others around you: Learn how to safely use, store and throw away your medicines at www.disposemymeds.org.          This list is accurate as of 2/21/18  3:54 PM.  Always use your most recent med list.                   Brand Name Dispense Instructions for use Diagnosis    * AMBIEN PO           * zolpidem 5 MG tablet    AMBIEN    30 tablet    Take 1 tablet (5 mg) by mouth nightly as needed for sleep    Insomnia, unspecified type       * cetirizine 5 MG tablet    zyrTEC          * ZYRTEC ALLERGY 10 MG Caps   Generic drug:  cetirizine HCl           diphenhydrAMINE 25 MG capsule    BENADRYL     Take 25 mg by mouth        * escitalopram 20 MG tablet    LEXAPRO    90 tablet    Take 1 tablet (20 mg) by mouth daily    Bipolar affective disorder, remission status unspecified (H)       * escitalopram 10 MG tablet    LEXAPRO    90 tablet    Take 1 tablet (10 mg) by mouth daily Take in addition to 20 mg already prescribed for a total dose of 30 mg daily    Bipolar affective disorder, remission status unspecified (H)       fluocinonide 0.05 % ointment    LIDEX     Apply affected areas on the thighs and under the breasts twice daily for 2-3 weeks        fluticasone 50 MCG/ACT spray    FLONASE     1 spray        klonoPIN 0.5 MG tablet   Generic drug:  clonazePAM      Take 0.5 mg by mouth        lamoTRIgine 200 MG tablet    LaMICtal    180 tablet    Take 2 tablets (400 mg) by mouth At Bedtime    Bipolar 2 disorder (H)       naproxen 500 MG tablet    NAPROSYN    60 tablet    Take 1 tablet (500 mg) by mouth 2 times daily (with meals)    Primary osteoarthritis of both knees       nystatin cream    MYCOSTATIN     Apply topically to affected areas under breast and groin twice daily.        RETIN-A 0.025 % cream   Generic drug:  tretinoin       Apply a small amount topically to face twice weekly at night and increase to nightly as tolerated.        triamcinolone acetonide 40 MG/ML injection    KENALOG    2 mL    2 mLs (80 mg) by INTRA-ARTICULAR route once for 1 dose    Primary osteoarthritis of both knees       * Notice:  This list has 6 medication(s) that are the same as other medications prescribed for you. Read the directions carefully, and ask your doctor or other care provider to review them with you.

## 2018-02-21 NOTE — LETTER
2/21/2018      RE: Mary Mathews  [address]   [place] [address]       February 21, 2018: Mary Mathews is a 57 year old female who is seen in f/u up for primary osteoarthritis of both knees. She has been going to the gym 3X/week doing the elliptical. She received her last knee injections in 9/14/18 which lasted around 5 months.           PMH:  No past medical history on file.    Active problem list:  There is no problem list on file for this patient.      FH:  No family history on file.    SH:  Social History     Social History     Marital status: Single     Spouse name: N/A     Number of children: N/A     Years of education: N/A     Occupational History     Not on file.     Social History Main Topics     Smoking status: Never Smoker     Smokeless tobacco: Never Used     Alcohol use Not on file     Drug use: Not on file     Sexual activity: Not on file     Other Topics Concern     Not on file     Social History Narrative       MEDS:  See EMR, reviewed  ALL:  See EMR, reviewed    REVIEW OF SYSTEMS:  CONSTITUTIONAL:NEGATIVE for fever, chills, change in weight  INTEGUMENTARY/SKIN: NEGATIVE for worrisome rashes, moles or lesions  EYES: NEGATIVE for vision changes or irritation  ENT/MOUTH: NEGATIVE for ear, mouth and throat problems  RESP:NEGATIVE for significant cough or SOB  BREAST: NEGATIVE for masses, tenderness or discharge  CV: NEGATIVE for chest pain, palpitations or peripheral edema  GI: NEGATIVE for nausea, abdominal pain, heartburn, or change in bowel habits  :NEGATIVE for frequency, dysuria, or hematuria  :NEGATIVE for frequency, dysuria, or hematuria  NEURO: NEGATIVE for weakness, dizziness or paresthesias  ENDOCRINE: NEGATIVE for temperature intolerance, skin/hair changes  HEME/ALLERGY/IMMUNE: NEGATIVE for bleeding problems  PSYCHIATRIC: NEGATIVE for changes in mood or affect            OBJECTIVE:  She has full range of motion of the bilateral knees to flexion and extension.  There is no  effusion or signs of cellulitis.  She has some mild tenderness along the medial joint line, nontender over the lateral joint line, nontender at the pes anserine bursa.  Normal range of motion at the hips.  Anterior and posterior drawer is negative bilaterally.  She does not seem to have a significant varus or valgus deformity.  Skin intact.  A/ox x3.       xrays knees 3V 12/2016  Moderate bilateral djd           ASSESSMENT:  DJD of the bilateral knees.       PLAN:  All conservative cares were explained including over-the-counter pain medicines and usual side effects, Synvisc injections, cortisone injections, physical therapy options,  brace and indications for knee replacement.  She does not want to consider knee replacement at this time.  I think it is reasonable to continue with the cortisone injections as long as she is getting such good relief.  So, after informed consent about bleeding, infection or steroid flare, after prepping with surgical scrub, she was injected in the right knee from a lateral approach in the seated position with 1 mL of Kenalog 40 and 5 mL of 1% lidocaine and injected the opposite knee from a medial approach with the same combination of medicines.  She tolerated this without difficulty.  She will look for improvement with the injections and follow up as needed.       Rodríguez Wilosn MD

## 2018-03-27 ENCOUNTER — TELEPHONE (OUTPATIENT)
Dept: PSYCHIATRY | Facility: CLINIC | Age: 58
End: 2018-03-27

## 2018-03-27 NOTE — TELEPHONE ENCOUNTER
FW: Med Refill Request - [facility]   Received: Today       Nichelle Ma, Nichelle Jo, RN       Phone Number: [phone]                       Previous Messages       ----- Message -----      From: Rosa Bishop      Sent: 3/27/2018   9:43 AM        To: Liliane Bautista RN   Subject: Med Refill Request - [facility]                         Caller:  pt   Relationship to pt: self   Medication:   Lamichtal two strengths, Lexapro   How many days left of med do you have left (if >3 day supply, redirect to pharmacy): 2   Pharmacy and location: [facility] on [address]   Pending appt date:  4/3/18   Okay to leave detailed VM:  yes     The pharmacy has sent requests as well.  Patient is very nervous about not being able to take her meds.     Patient wants this to be urgent.

## 2018-03-27 NOTE — TELEPHONE ENCOUNTER
Last seen: 2/13/18  RTC: 4 weeks  Cancel: none  No-show: 3/13/18  Next appt: 4/3/18     Medication requested: Lamotrigine 200 mg  Directions: Take 2 tablets by mouth at bedtime   Qty: 180  Last refilled: 2/2/18 per [facility] pharmacy      Medication requested: Escitalopram 10 mg  Directions: Take 1 tablet (10 mg) by mouth daily Take in addition to 20 mg already prescribed for a total dose of 30 mg daily  Qty: 90  Last refilled: 1/30/18 per [facility] pharmacy     Medication requested: Escitalopram 20 mg   Directions: Take 1 tablet (20 mg) by mouth daily  Qty: 90  Last refilled: 1/30/18 per [facility] pharmacy    -According to med tab, prescriptions were sent to [facility] on [address] on 2/13/18, and pt should have enough tablets for a 90 d/s of all medications, with 1 refill for the Escitalopram 10 mg and 20 mg.   -Called pt about medication adherence. Pt states she's taking medications as prescribed, but she's running out. Stated she would like prescriptions sent to [facility] off of [address]  -Called [facility] to see if medications were filled and picked up. Staff states they received prescriptions for all three medications on 2/18/18, but the medications were never picked up.  -Called [facility] pharmacy to have prescriptions transferred over to [facility] per pt request. Staff verbalized understanding and said he would do so.   -Called pt to inform her of the situation and alert her that medications can now be picked up at [facility] off of [address].

## 2018-04-04 ENCOUNTER — OFFICE VISIT (OUTPATIENT)
Dept: PSYCHIATRY | Facility: CLINIC | Age: 58
End: 2018-04-04
Attending: PSYCHIATRY & NEUROLOGY
Payer: COMMERCIAL

## 2018-04-04 VITALS
WEIGHT: 179.8 LBS | BODY MASS INDEX: 36.32 KG/M2 | HEART RATE: 63 BPM | SYSTOLIC BLOOD PRESSURE: 137 MMHG | DIASTOLIC BLOOD PRESSURE: 89 MMHG

## 2018-04-04 DIAGNOSIS — F31.9 BIPOLAR AFFECTIVE DISORDER, REMISSION STATUS UNSPECIFIED (H): ICD-10-CM

## 2018-04-04 DIAGNOSIS — F31.81 BIPOLAR 2 DISORDER (H): ICD-10-CM

## 2018-04-04 PROCEDURE — G0463 HOSPITAL OUTPT CLINIC VISIT: HCPCS | Mod: ZF

## 2018-04-04 RX ORDER — ESCITALOPRAM OXALATE 10 MG/1
10 TABLET ORAL DAILY
Qty: 90 TABLET | Refills: 1 | Status: SHIPPED | OUTPATIENT
Start: 2018-04-04

## 2018-04-04 RX ORDER — LAMOTRIGINE 200 MG/1
400 TABLET ORAL AT BEDTIME
Qty: 180 TABLET | Refills: 0 | Status: SHIPPED | OUTPATIENT
Start: 2018-04-04

## 2018-04-04 RX ORDER — ESCITALOPRAM OXALATE 20 MG/1
20 TABLET ORAL DAILY
Qty: 90 TABLET | Refills: 1 | Status: SHIPPED | OUTPATIENT
Start: 2018-04-04

## 2018-04-04 ASSESSMENT — PAIN SCALES - GENERAL: PAINLEVEL: NO PAIN (0)

## 2018-04-04 NOTE — NURSING NOTE
Chief Complaint   Patient presents with     RECHECK     Bipolar 2 disorder      Reviewed allergies, smoking status, pharmacy preference and medications  Administered abuse screening questions   Obtained weight, blood pressure and heart rate

## 2018-04-04 NOTE — MR AVS SNAPSHOT
After Visit Summary   2018    Mary Mathews    MRN: [id number]           Patient Information     Date Of Birth          1960        Visit Information        Provider Department      2018 4:00 PM Boaz Saleem MD [facility]        Today's Diagnoses     Bipolar 2 disorder (H)        Bipolar affective disorder, remission status unspecified (H)          Care Instructions    Continue your usual medications  Return in 2 months          Follow-ups after your visit        Follow-up notes from your care team     Return in about 2 months (around 2018).      Who to contact     Please call your clinic at [phone] to:    Ask questions about your health    Make or cancel appointments    Discuss your medicines    Learn about your test results    Speak to your doctor            Additional Information About Your Visit        MyChart Information     Aerovance is an electronic gateway that provides easy, online access to your medical records. With Aerovance, you can request a clinic appointment, read your test results, renew a prescription or communicate with your care team.     To sign up for [employer] visit the website at [email or web address]   You will be asked to enter the access code listed below, as well as some personal information. Please follow the directions to create your username and password.     Your access code is: [email or web address]  Expires: 2018  3:15 PM     Your access code will  in 90 days. If you need help or a new code, please contact your [facility] or call [phone] for assistance.        Care EveryWhere ID     This is your Care EveryWhere ID. This could be used by other organizations to access your [facility] medical records  [id number]        Your Vitals Were     Pulse BMI (Body Mass Index)                63 36.32 kg/m2           Blood Pressure from Last 3 Encounters:   18 137/89   18 143/81   18 138/82     Weight from Last 3 Encounters:   04/04/18 81.6 kg (179 lb 12.8 oz)   09/14/17 77.1 kg (170 lb)   01/10/17 83 kg (183 lb)              Today, you had the following     No orders found for display         Where to get your medicines      These medications were sent to [facility]/pharmacy #[id number] - [place], [place] - [address]  [address], [place] [address]     Phone:  [phone]     escitalopram 10 MG tablet    escitalopram 20 MG tablet    lamoTRIgine 200 MG tablet          Primary Care Provider Office Phone # Fax #    Gretel Albania [facility] [phone]       [address] [place] [address] [address] [place] [address]        Equal Access to Services     RADHA CUELLAR : Riccardo meanso Soashlie, waaxda luqadaha, qaybta kaalmada adeegyada, adrien gruber. So [facility] [phone].    ATENCIÓN: Si habla español, tiene a salas disposición servicios gratuitos de asistencia lingüística. [facility] [phone].    We comply with applicable federal civil rights laws and [place] laws. We do not discriminate on the basis of race, color, national origin, age, disability, sex, sexual orientation, or gender identity.            Thank you!     Thank you for choosing [facility]  for your care. Our goal is always to provide you with excellent care. Hearing back from our patients is one way we can continue to improve our services. Please take a few minutes to complete the written survey that you may receive in the mail after your visit with us. Thank you!             Your Updated Medication List - Protect others around you: Learn how to safely use, store and throw away your medicines at www.disposemymeds.org.          This list is accurate as of 4/4/18  4:37 PM.  Always use your most recent med list.                   Brand Name Dispense Instructions for use Diagnosis    * AMBIEN PO           * zolpidem 5 MG tablet    AMBIEN    30 tablet    Take 1 tablet (5 mg) by mouth nightly as  needed for sleep    Insomnia, unspecified type       * cetirizine 5 MG tablet    zyrTEC          * ZYRTEC ALLERGY 10 MG Caps   Generic drug:  cetirizine HCl           diphenhydrAMINE 25 MG capsule    BENADRYL     Take 25 mg by mouth        * escitalopram 20 MG tablet    LEXAPRO    90 tablet    Take 1 tablet (20 mg) by mouth daily    Bipolar affective disorder, remission status unspecified (H)       * escitalopram 10 MG tablet    LEXAPRO    90 tablet    Take 1 tablet (10 mg) by mouth daily Take in addition to 20 mg already prescribed for a total dose of 30 mg daily    Bipolar affective disorder, remission status unspecified (H)       fluocinonide 0.05 % ointment    LIDEX     Apply affected areas on the thighs and under the breasts twice daily for 2-3 weeks        fluticasone 50 MCG/ACT spray    FLONASE     1 spray        klonoPIN 0.5 MG tablet   Generic drug:  clonazePAM      Take 0.5 mg by mouth        lamoTRIgine 200 MG tablet    LaMICtal    180 tablet    Take 2 tablets (400 mg) by mouth At Bedtime    Bipolar 2 disorder (H)       naproxen 500 MG tablet    NAPROSYN    60 tablet    Take 1 tablet (500 mg) by mouth 2 times daily (with meals)    Primary osteoarthritis of both knees       nystatin cream    MYCOSTATIN     Apply topically to affected areas under breast and groin twice daily.        RETIN-A 0.025 % cream   Generic drug:  tretinoin      Apply a small amount topically to face twice weekly at night and increase to nightly as tolerated.        * Notice:  This list has 6 medication(s) that are the same as other medications prescribed for you. Read the directions carefully, and ask your doctor or other care provider to review them with you.

## 2018-04-04 NOTE — PROGRESS NOTES
Apr 4, 2018  Progress Note    Mary Mathews is a 57 year old year old female with Bipolar II Disorder (296.89) who presents for ongoing psychiatric care. She has been  for 10 years but her  lives in [place] and they have no contact; she considers herself single. She has been in [place] for 2 years and lives on her own in [facility]. She hopes to move back to [place] in the future. She has no children. She works as a  for [employer].     Diagnosis    Axis 1: Bipolar II disorder. Maintenance therapy has helped but Mary estimates she spends about 30% of the year depressed or hypomanic.   Axis 2: Likely Cluster B personality traits/disorder  Axis 3: MSK problems including gout, tendonitis in foot, knee pain. Remote cholecystectomy. Remote breast biopsy  Axis 4: Severe stressors - unhappy living in [place]; unhappy with job; socially isolated; mother in [facility] with dementia (they have a difficult relationship)  Axis 5: GAF at time of visit: 70    Assessment & Plan     Since the last visit, Mary has been doing well.  She has been adherent with lamotrigine 400 mg at bedtime and Lexapro 30 mg daily.  Things remain stressful, with her mom's dementia, and Mary having to decide whether to move to [place] to be with her brother.  However, her brother is being very supportive, and is helping to take care of Mary's mom also.  She has started going to the gym regularly, and I have strongly encouraged her to continue this.  She will continue her current medications and return in 2 months.    The patient understands the risks, benefits, adverse effects and alternatives. Agrees to treatment with the capacity to do so. Not pregnant and no medical contraindications to treatment. Agrees to call clinic for any problems. The patient understands to call 911 or come to the nearest ED if life threatening or urgent symptoms present.    Attestation:  Patient has been seen and evaluated by me, Boaz Carvajal  MD Stiven, PhD.    I spent a total time of 40 minutes face-to-face with the patient during today s office visit.  Over 50% of this time was spent counseling the patient and/or coordinating care regarding diagnostic assessment, medication management.    HPI     Since the last visit, Mary reports that her mood has been stable.  She denies symptoms of depression or hypomania.  She is sleeping well.  She is motivated and reasonably energetic.  She has started going to the gym regularly, typically 5-7 days per week.  She does cardio and weights.  She has lost some weight, and is starting to see some definition in her muscles.  She is pleased with the results.  She also recognizes that working out is good for her physical and emotional health.    Mary's brother has advised her to keep her contact with her mom brief and superficial, and I concur that this is good advice.  Mary and her mom have had a difficult relationship over the years, and if anything it has worsened with her mom's progressive dementia.  Mary seems to be coming to her to terms with the fact that she can only be of so much assistance to her mother.  She is grateful to her brother, who has stepped in and is providing some support to their mother.  She also feels less pressure to move to [place] to be with him.    Mary recognizes that her medications are effective.  I have also provided her with some information about the mental health benefits of exercise.      Mary will continue her usual medications.  She will return in 2 months.    MOOD  Denies current symptoms of depression or hypo/phuong.   ANXIETY  Endorses Symptoms of Generalized Anxiety; PTSD symptoms   PSYCHOSIS  Denies current psychotic symptoms.  MEDICATION ADHERENCE: Good   Current Medications     Current Outpatient Prescriptions   Medication Sig Dispense Refill     lamoTRIgine (LAMICTAL) 200 MG tablet Take 2 tablets (400 mg) by mouth At Bedtime 180 tablet 0     escitalopram  (LEXAPRO) 20 MG tablet Take 1 tablet (20 mg) by mouth daily 90 tablet 1     escitalopram (LEXAPRO) 10 MG tablet Take 1 tablet (10 mg) by mouth daily Take in addition to 20 mg already prescribed for a total dose of 30 mg daily 90 tablet 1     naproxen (NAPROSYN) 500 MG tablet Take 1 tablet (500 mg) by mouth 2 times daily (with meals) 60 tablet 1     zolpidem (AMBIEN) 5 MG tablet Take 1 tablet (5 mg) by mouth nightly as needed for sleep 30 tablet 3     clonazePAM (KLONOPIN) 0.5 MG tablet Take 0.5 mg by mouth       diphenhydrAMINE (BENADRYL) 25 MG capsule Take 25 mg by mouth       cetirizine (ZYRTEC) 5 MG tablet        fluocinonide (LIDEX) 0.05 % ointment Apply affected areas on the thighs and under the breasts twice daily for 2-3 weeks       fluticasone (FLONASE) 50 MCG/ACT nasal spray 1 spray       nystatin (MYCOSTATIN) cream Apply topically to affected areas under breast and groin twice daily.       tretinoin (RETIN-A) 0.025 % cream Apply a small amount topically to face twice weekly at night and increase to nightly as tolerated.       Zolpidem Tartrate (AMBIEN PO)        cetirizine HCl (ZYRTEC ALLERGY) 10 MG CAPS            Substance use     ETOH: Nil  Cigarettes: Nil  Street Drugs: Nil    Review of Systems     A comprehensive review of systems was performed and is negative other than noted above.     Allergies     Allergies   Allergen Reactions     Acetaminophen Hives     Aspirin Hives     Seasonal Allergies Cough, Difficulty breathing and Itching        Mental Status Exam     /89  Pulse 63  Wt 81.6 kg (179 lb 12.8 oz)  BMI 36.32 kg/m2    Alertness: alert  and oriented  Appearance: well groomed  Behavior/Demeanor: cooperative, pleasant and calm, with good  eye contact  Speech: normal  Language: intact  Psychomotor: normal or unremarkable  Mood:  description consistent with euthymia  Affect: full range; was congruent to mood  Thought Process/Associations: unremarkable  Thought Content:  Admits suicidal  ideation and passive suicidal ideation with no plan or intent  Perception:  Denies hallucinations  Insight: good  Judgment: good  Cognition:  does appear grossly intact.    Laboratory

## 2018-04-06 ASSESSMENT — PATIENT HEALTH QUESTIONNAIRE - PHQ9: SUM OF ALL RESPONSES TO PHQ QUESTIONS 1-9: 2

## 2018-04-24 ENCOUNTER — TELEPHONE (OUTPATIENT)
Dept: PSYCHIATRY | Facility: CLINIC | Age: 58
End: 2018-04-24

## 2018-04-24 NOTE — TELEPHONE ENCOUNTER
Returned call to pharmacy tech with [facility].  Pharmacy had faxed refill request for escitalopram but rec'd the fax back on 4/19/18 with handwritten note which they had questions about.  Unable to find documentation of this in the EHR.  Relayed that on 4/4/18 refills of this medication had been sent to a local [facility] in [place].  Asked it pt was requesting to refill through [facility] now.  Pharmacy tech denied this stating it was an auto refill.  Since refills were sent to local pharmacy they will cancel their request for refill.

## 2018-04-24 NOTE — TELEPHONE ENCOUNTER
"----- Message from Brenda Esparza sent at 4/23/2018  4:38 PM CDT -----  Regarding: pharmacy call  Contact: [phone]  Pharmacy calling and location: [facility]  Name of person calling: Jai  Medication: escitalopram 20mg  Reason: \"see original order--pharmacy transfer request\"--trying to clarify whether the script was approved or denied. Reference #: [id number]      "

## 2018-05-10 DIAGNOSIS — M17.0 PRIMARY OSTEOARTHRITIS OF BOTH KNEES: ICD-10-CM

## 2018-05-12 NOTE — TELEPHONE ENCOUNTER
naproxen (NAPROSYN) 500 MG tablet  Last Written Prescription Date:  9/14/17  Last Fill Quantity: 60,   # refills: 1  Last Office Visit : 2/21/18  Future Office visit: none    Routing  Because:  labs

## 2018-05-14 RX ORDER — NAPROXEN 500 MG/1
500 TABLET ORAL 2 TIMES DAILY WITH MEALS
Qty: 60 TABLET | Refills: 1 | Status: SHIPPED | OUTPATIENT
Start: 2018-05-14

## 2018-06-22 ENCOUNTER — TELEPHONE (OUTPATIENT)
Dept: PSYCHIATRY | Facility: CLINIC | Age: 58
End: 2018-06-22

## 2018-06-22 NOTE — TELEPHONE ENCOUNTER
On 6/11/2018 the patient signed an ELIZABETH authorizing medical records to be released from [facility] to [facility] for the purpose of continuity of care or discharge planning.  I sent the original  ELIZABETH to Medical Records via the tube system and kept a copy in psychiatry until scanning is complete/confirmed. June Howard MA

## 2018-09-24 DIAGNOSIS — F31.81 BIPOLAR 2 DISORDER (H): ICD-10-CM

## 2018-09-24 RX ORDER — LAMOTRIGINE 200 MG/1
400 TABLET ORAL AT BEDTIME
Qty: 60 TABLET | Refills: 0 | Status: CANCELLED | OUTPATIENT
Start: 2018-09-24

## 2018-09-24 NOTE — TELEPHONE ENCOUNTER
Medication requested:  lamoTRIgine (LAMICTAL) 200 MG   Last refilled: 6/20/18  Qty: 180    Last seen: 4/4/18  RTC: 2 MOS  Cancel: 0  No-show: 0  Next appt: NONE  Refill decision: Pt outside of RTC timeframe. 30 DAY RF PENDING ( HX OF NO SHOWS)   REQUEST IS FROM [facility]      CURRENT PT?

## 2018-09-25 NOTE — TELEPHONE ENCOUNTER
- Writer left voice mail message for pt, requesting callback due to refill request from [place] pharmacy.

## 2018-10-19 NOTE — TELEPHONE ENCOUNTER
- Writer left voice mail message for pt, identifying refill request and requesting callback to explore the state of her plan for continuing care with this clinic and her engagement with providers in [place].
